# Patient Record
Sex: MALE | Race: BLACK OR AFRICAN AMERICAN | NOT HISPANIC OR LATINO | URBAN - METROPOLITAN AREA
[De-identification: names, ages, dates, MRNs, and addresses within clinical notes are randomized per-mention and may not be internally consistent; named-entity substitution may affect disease eponyms.]

---

## 2024-03-20 ENCOUNTER — HOSPITAL ENCOUNTER (INPATIENT)
Facility: HOSPITAL | Age: 69
LOS: 2 days | Discharge: HOME | DRG: 291 | End: 2024-03-22
Attending: EMERGENCY MEDICINE | Admitting: HOSPITALIST
Payer: COMMERCIAL

## 2024-03-20 DIAGNOSIS — E11.22 TYPE 2 DIABETES MELLITUS WITH STAGE 4 CHRONIC KIDNEY DISEASE, WITH LONG-TERM CURRENT USE OF INSULIN: ICD-10-CM

## 2024-03-20 DIAGNOSIS — R07.9 CHEST PAIN: ICD-10-CM

## 2024-03-20 DIAGNOSIS — J81.0 ACUTE PULMONARY EDEMA: ICD-10-CM

## 2024-03-20 DIAGNOSIS — Z79.4 TYPE 2 DIABETES MELLITUS WITH STAGE 4 CHRONIC KIDNEY DISEASE, WITH LONG-TERM CURRENT USE OF INSULIN: ICD-10-CM

## 2024-03-20 DIAGNOSIS — I10 BENIGN ESSENTIAL HYPERTENSION: Chronic | ICD-10-CM

## 2024-03-20 DIAGNOSIS — N18.4 TYPE 2 DIABETES MELLITUS WITH STAGE 4 CHRONIC KIDNEY DISEASE, WITH LONG-TERM CURRENT USE OF INSULIN: ICD-10-CM

## 2024-03-20 DIAGNOSIS — I50.9 ACUTE ON CHRONIC CONGESTIVE HEART FAILURE, UNSPECIFIED HEART FAILURE TYPE: ICD-10-CM

## 2024-03-20 DIAGNOSIS — J81.1 PULMONARY EDEMA: ICD-10-CM

## 2024-03-20 DIAGNOSIS — R06.02 SOB (SHORTNESS OF BREATH): ICD-10-CM

## 2024-03-20 DIAGNOSIS — R79.89 TROPONIN I ABOVE REFERENCE RANGE: ICD-10-CM

## 2024-03-20 DIAGNOSIS — R06.02 SHORTNESS OF BREATH: ICD-10-CM

## 2024-03-20 DIAGNOSIS — N17.9 AKI (ACUTE KIDNEY INJURY): Primary | ICD-10-CM

## 2024-03-20 PROBLEM — N18.9 ANEMIA DUE TO CHRONIC KIDNEY DISEASE: Status: ACTIVE | Noted: 2024-03-20

## 2024-03-20 PROBLEM — I25.10 CORONARY ARTERY DISEASE INVOLVING NATIVE CORONARY ARTERY WITHOUT ANGINA PECTORIS: Chronic | Status: ACTIVE | Noted: 2023-07-27

## 2024-03-20 PROBLEM — I63.9 CVA (CEREBRAL VASCULAR ACCIDENT): Status: RESOLVED | Noted: 2024-03-20 | Resolved: 2024-03-20

## 2024-03-20 PROBLEM — R60.0 BILATERAL EDEMA OF LOWER EXTREMITY: Chronic | Status: ACTIVE | Noted: 2019-01-16

## 2024-03-20 PROBLEM — B19.20 HEPATITIS C INFECTION: Status: ACTIVE | Noted: 2017-01-31

## 2024-03-20 PROBLEM — G47.33 OSA (OBSTRUCTIVE SLEEP APNEA): Chronic | Status: ACTIVE | Noted: 2019-08-27

## 2024-03-20 PROBLEM — E11.51 TYPE 2 DIABETES MELLITUS WITH PERIPHERAL CIRCULATORY DISORDER: Status: RESOLVED | Noted: 2024-01-29 | Resolved: 2024-03-20

## 2024-03-20 PROBLEM — E87.5 HYPERKALEMIA: Status: ACTIVE | Noted: 2024-03-20

## 2024-03-20 PROBLEM — D63.1 ANEMIA DUE TO CHRONIC KIDNEY DISEASE: Status: ACTIVE | Noted: 2024-03-20

## 2024-03-20 LAB
ALBUMIN SERPL BCP-MCNC: 3.5 G/DL (ref 3.5–5.2)
ALP SERPL-CCNC: 114 U/L (ref 55–135)
ALT SERPL W/O P-5'-P-CCNC: 15 U/L (ref 10–44)
ANION GAP SERPL CALC-SCNC: 8 MMOL/L (ref 8–16)
AST SERPL-CCNC: 18 U/L (ref 10–40)
BACTERIA #/AREA URNS AUTO: NORMAL /HPF
BASOPHILS # BLD AUTO: 0.02 K/UL (ref 0–0.2)
BASOPHILS NFR BLD: 0.3 % (ref 0–1.9)
BILIRUB SERPL-MCNC: 0.3 MG/DL (ref 0.1–1)
BILIRUB UR QL STRIP: NEGATIVE
BNP SERPL-MCNC: 429 PG/ML (ref 0–99)
BUN SERPL-MCNC: 58 MG/DL (ref 8–23)
CALCIUM SERPL-MCNC: 8.6 MG/DL (ref 8.7–10.5)
CHLORIDE SERPL-SCNC: 114 MMOL/L (ref 95–110)
CLARITY UR REFRACT.AUTO: CLEAR
CO2 SERPL-SCNC: 17 MMOL/L (ref 23–29)
COLOR UR AUTO: ABNORMAL
CREAT SERPL-MCNC: 3.5 MG/DL (ref 0.5–1.4)
DIFFERENTIAL METHOD BLD: ABNORMAL
EOSINOPHIL # BLD AUTO: 0.1 K/UL (ref 0–0.5)
EOSINOPHIL NFR BLD: 1.7 % (ref 0–8)
ERYTHROCYTE [DISTWIDTH] IN BLOOD BY AUTOMATED COUNT: 15.6 % (ref 11.5–14.5)
EST. GFR  (NO RACE VARIABLE): 18.3 ML/MIN/1.73 M^2
GLUCOSE SERPL-MCNC: 81 MG/DL (ref 70–110)
GLUCOSE UR QL STRIP: NEGATIVE
HCT VFR BLD AUTO: 28.9 % (ref 40–54)
HCV AB SERPL QL IA: REACTIVE
HGB BLD-MCNC: 8.8 G/DL (ref 14–18)
HGB UR QL STRIP: ABNORMAL
HIV 1+2 AB+HIV1 P24 AG SERPL QL IA: NORMAL
HYALINE CASTS UR QL AUTO: 0 /LPF
IMM GRANULOCYTES # BLD AUTO: 0.04 K/UL (ref 0–0.04)
IMM GRANULOCYTES NFR BLD AUTO: 0.6 % (ref 0–0.5)
INFLUENZA A, MOLECULAR: NEGATIVE
INFLUENZA B, MOLECULAR: NEGATIVE
KETONES UR QL STRIP: NEGATIVE
LEUKOCYTE ESTERASE UR QL STRIP: NEGATIVE
LYMPHOCYTES # BLD AUTO: 1 K/UL (ref 1–4.8)
LYMPHOCYTES NFR BLD: 15 % (ref 18–48)
MCH RBC QN AUTO: 23.4 PG (ref 27–31)
MCHC RBC AUTO-ENTMCNC: 30.4 G/DL (ref 32–36)
MCV RBC AUTO: 77 FL (ref 82–98)
MICROSCOPIC COMMENT: NORMAL
MONOCYTES # BLD AUTO: 0.7 K/UL (ref 0.3–1)
MONOCYTES NFR BLD: 10.4 % (ref 4–15)
NEUTROPHILS # BLD AUTO: 4.7 K/UL (ref 1.8–7.7)
NEUTROPHILS NFR BLD: 72 % (ref 38–73)
NITRITE UR QL STRIP: NEGATIVE
NRBC BLD-RTO: 0 /100 WBC
PH UR STRIP: 5 [PH] (ref 5–8)
PLATELET # BLD AUTO: 158 K/UL (ref 150–450)
PMV BLD AUTO: 11.9 FL (ref 9.2–12.9)
POTASSIUM SERPL-SCNC: 5.3 MMOL/L (ref 3.5–5.1)
PROT SERPL-MCNC: 7.7 G/DL (ref 6–8.4)
PROT UR QL STRIP: ABNORMAL
RBC # BLD AUTO: 3.76 M/UL (ref 4.6–6.2)
RBC #/AREA URNS AUTO: 1 /HPF (ref 0–4)
SARS-COV-2 RDRP RESP QL NAA+PROBE: NEGATIVE
SODIUM SERPL-SCNC: 139 MMOL/L (ref 136–145)
SP GR UR STRIP: 1.01 (ref 1–1.03)
SPECIMEN SOURCE: NORMAL
TROPONIN I SERPL DL<=0.01 NG/ML-MCNC: 0.04 NG/ML (ref 0–0.03)
TROPONIN I SERPL DL<=0.01 NG/ML-MCNC: 0.04 NG/ML (ref 0–0.03)
URN SPEC COLLECT METH UR: ABNORMAL
WBC # BLD AUTO: 6.46 K/UL (ref 3.9–12.7)
WBC #/AREA URNS AUTO: 2 /HPF (ref 0–5)

## 2024-03-20 PROCEDURE — 81001 URINALYSIS AUTO W/SCOPE: CPT | Performed by: EMERGENCY MEDICINE

## 2024-03-20 PROCEDURE — 63600175 PHARM REV CODE 636 W HCPCS: Performed by: EMERGENCY MEDICINE

## 2024-03-20 PROCEDURE — 63600175 PHARM REV CODE 636 W HCPCS: Performed by: HOSPITALIST

## 2024-03-20 PROCEDURE — 93005 ELECTROCARDIOGRAM TRACING: CPT

## 2024-03-20 PROCEDURE — U0002 COVID-19 LAB TEST NON-CDC: HCPCS | Performed by: EMERGENCY MEDICINE

## 2024-03-20 PROCEDURE — 93010 ELECTROCARDIOGRAM REPORT: CPT | Mod: 76,,, | Performed by: INTERNAL MEDICINE

## 2024-03-20 PROCEDURE — 87522 HEPATITIS C REVRS TRNSCRPJ: CPT | Performed by: PHYSICIAN ASSISTANT

## 2024-03-20 PROCEDURE — 84484 ASSAY OF TROPONIN QUANT: CPT | Mod: 91 | Performed by: EMERGENCY MEDICINE

## 2024-03-20 PROCEDURE — 84484 ASSAY OF TROPONIN QUANT: CPT | Performed by: NURSE PRACTITIONER

## 2024-03-20 PROCEDURE — 87389 HIV-1 AG W/HIV-1&-2 AB AG IA: CPT | Performed by: PHYSICIAN ASSISTANT

## 2024-03-20 PROCEDURE — 93010 ELECTROCARDIOGRAM REPORT: CPT | Mod: ,,, | Performed by: INTERNAL MEDICINE

## 2024-03-20 PROCEDURE — 86803 HEPATITIS C AB TEST: CPT | Performed by: PHYSICIAN ASSISTANT

## 2024-03-20 PROCEDURE — 85025 COMPLETE CBC W/AUTO DIFF WBC: CPT | Performed by: NURSE PRACTITIONER

## 2024-03-20 PROCEDURE — 83880 ASSAY OF NATRIURETIC PEPTIDE: CPT | Performed by: NURSE PRACTITIONER

## 2024-03-20 PROCEDURE — 87502 INFLUENZA DNA AMP PROBE: CPT | Performed by: EMERGENCY MEDICINE

## 2024-03-20 PROCEDURE — 99285 EMERGENCY DEPT VISIT HI MDM: CPT | Mod: 25

## 2024-03-20 PROCEDURE — 11000001 HC ACUTE MED/SURG PRIVATE ROOM

## 2024-03-20 PROCEDURE — 80053 COMPREHEN METABOLIC PANEL: CPT | Performed by: NURSE PRACTITIONER

## 2024-03-20 PROCEDURE — 21400001 HC TELEMETRY ROOM

## 2024-03-20 RX ORDER — PROCHLORPERAZINE EDISYLATE 5 MG/ML
5 INJECTION INTRAMUSCULAR; INTRAVENOUS EVERY 6 HOURS PRN
Status: DISCONTINUED | OUTPATIENT
Start: 2024-03-20 | End: 2024-03-22 | Stop reason: HOSPADM

## 2024-03-20 RX ORDER — AMOXICILLIN 250 MG
1 CAPSULE ORAL DAILY PRN
Status: DISCONTINUED | OUTPATIENT
Start: 2024-03-20 | End: 2024-03-22 | Stop reason: HOSPADM

## 2024-03-20 RX ORDER — FUROSEMIDE 10 MG/ML
80 INJECTION INTRAMUSCULAR; INTRAVENOUS ONCE
Status: COMPLETED | OUTPATIENT
Start: 2024-03-20 | End: 2024-03-20

## 2024-03-20 RX ORDER — NALOXONE HCL 0.4 MG/ML
0.02 VIAL (ML) INJECTION
Status: DISCONTINUED | OUTPATIENT
Start: 2024-03-20 | End: 2024-03-22 | Stop reason: HOSPADM

## 2024-03-20 RX ORDER — HEPARIN SODIUM 5000 [USP'U]/ML
5000 INJECTION, SOLUTION INTRAVENOUS; SUBCUTANEOUS EVERY 8 HOURS
Status: DISCONTINUED | OUTPATIENT
Start: 2024-03-21 | End: 2024-03-22 | Stop reason: HOSPADM

## 2024-03-20 RX ORDER — IBUPROFEN 200 MG
16 TABLET ORAL
Status: DISCONTINUED | OUTPATIENT
Start: 2024-03-20 | End: 2024-03-22 | Stop reason: HOSPADM

## 2024-03-20 RX ORDER — INSULIN GLARGINE 100 [IU]/ML
25 INJECTION, SOLUTION SUBCUTANEOUS NIGHTLY
COMMUNITY
Start: 2024-03-19

## 2024-03-20 RX ORDER — LOSARTAN POTASSIUM 25 MG/1
TABLET ORAL
Status: ON HOLD | COMMUNITY
Start: 2024-01-31 | End: 2024-03-22 | Stop reason: HOSPADM

## 2024-03-20 RX ORDER — DIPHENHYDRAMINE HCL 25 MG
1000 CAPSULE ORAL DAILY
Status: ON HOLD | COMMUNITY
Start: 2024-02-19 | End: 2024-03-21

## 2024-03-20 RX ORDER — ASPIRIN 81 MG/1
1 TABLET ORAL DAILY
COMMUNITY

## 2024-03-20 RX ORDER — FUROSEMIDE 10 MG/ML
40 INJECTION INTRAMUSCULAR; INTRAVENOUS
Status: COMPLETED | OUTPATIENT
Start: 2024-03-20 | End: 2024-03-20

## 2024-03-20 RX ORDER — GLIPIZIDE 10 MG/1
10 TABLET ORAL 2 TIMES DAILY WITH MEALS
Status: ON HOLD | COMMUNITY
Start: 2023-07-27 | End: 2024-03-22

## 2024-03-20 RX ORDER — DOCUSATE SODIUM 100 MG/1
100 CAPSULE, LIQUID FILLED ORAL DAILY
Status: ON HOLD | COMMUNITY
Start: 2023-04-28 | End: 2024-03-21

## 2024-03-20 RX ORDER — FERROUS SULFATE 325(65) MG
325 TABLET ORAL DAILY
Status: ON HOLD | COMMUNITY
Start: 2023-04-28 | End: 2024-03-21

## 2024-03-20 RX ORDER — GLUCAGON 1 MG
1 KIT INJECTION
Status: DISCONTINUED | OUTPATIENT
Start: 2024-03-20 | End: 2024-03-22 | Stop reason: HOSPADM

## 2024-03-20 RX ORDER — FUROSEMIDE 20 MG/1
20 TABLET ORAL DAILY
Status: ON HOLD | COMMUNITY
Start: 2023-08-04 | End: 2024-03-21

## 2024-03-20 RX ORDER — CARVEDILOL 6.25 MG/1
6.25 TABLET ORAL 2 TIMES DAILY
Status: ON HOLD | COMMUNITY
Start: 2024-02-05 | End: 2024-03-21

## 2024-03-20 RX ORDER — CARVEDILOL 12.5 MG/1
12.5 TABLET ORAL 2 TIMES DAILY
COMMUNITY
Start: 2024-02-19

## 2024-03-20 RX ORDER — ACETAMINOPHEN 325 MG/1
650 TABLET ORAL EVERY 4 HOURS PRN
Status: DISCONTINUED | OUTPATIENT
Start: 2024-03-20 | End: 2024-03-22 | Stop reason: HOSPADM

## 2024-03-20 RX ORDER — ALUMINUM HYDROXIDE, MAGNESIUM HYDROXIDE, AND SIMETHICONE 1200; 120; 1200 MG/30ML; MG/30ML; MG/30ML
30 SUSPENSION ORAL 4 TIMES DAILY PRN
Status: DISCONTINUED | OUTPATIENT
Start: 2024-03-20 | End: 2024-03-22 | Stop reason: HOSPADM

## 2024-03-20 RX ORDER — FAMOTIDINE 20 MG/1
1 TABLET, FILM COATED ORAL NIGHTLY PRN
COMMUNITY
Start: 2024-01-29

## 2024-03-20 RX ORDER — FLUTICASONE PROPIONATE 50 MCG
1 SPRAY, SUSPENSION (ML) NASAL DAILY PRN
COMMUNITY
Start: 2023-12-06

## 2024-03-20 RX ORDER — IBUPROFEN 200 MG
24 TABLET ORAL
Status: DISCONTINUED | OUTPATIENT
Start: 2024-03-20 | End: 2024-03-22 | Stop reason: HOSPADM

## 2024-03-20 RX ORDER — SODIUM CHLORIDE 0.9 % (FLUSH) 0.9 %
10 SYRINGE (ML) INJECTION EVERY 6 HOURS PRN
Status: DISCONTINUED | OUTPATIENT
Start: 2024-03-20 | End: 2024-03-22 | Stop reason: HOSPADM

## 2024-03-20 RX ORDER — POLYETHYLENE GLYCOL 3350 17 G/17G
17 POWDER, FOR SOLUTION ORAL 2 TIMES DAILY PRN
Status: DISCONTINUED | OUTPATIENT
Start: 2024-03-20 | End: 2024-03-22 | Stop reason: HOSPADM

## 2024-03-20 RX ORDER — TALC
6 POWDER (GRAM) TOPICAL NIGHTLY PRN
Status: DISCONTINUED | OUTPATIENT
Start: 2024-03-20 | End: 2024-03-22 | Stop reason: HOSPADM

## 2024-03-20 RX ORDER — INSULIN ASPART 100 [IU]/ML
0-5 INJECTION, SOLUTION INTRAVENOUS; SUBCUTANEOUS
Status: DISCONTINUED | OUTPATIENT
Start: 2024-03-20 | End: 2024-03-22 | Stop reason: HOSPADM

## 2024-03-20 RX ORDER — ONDANSETRON HYDROCHLORIDE 2 MG/ML
4 INJECTION, SOLUTION INTRAVENOUS EVERY 8 HOURS PRN
Status: DISCONTINUED | OUTPATIENT
Start: 2024-03-20 | End: 2024-03-22 | Stop reason: HOSPADM

## 2024-03-20 RX ORDER — NIFEDIPINE 90 MG/1
90 TABLET, EXTENDED RELEASE ORAL DAILY
COMMUNITY
Start: 2024-01-29 | End: 2024-07-27

## 2024-03-20 RX ORDER — ATORVASTATIN CALCIUM 80 MG/1
80 TABLET, FILM COATED ORAL DAILY
COMMUNITY
Start: 2023-07-27 | End: 2024-07-27

## 2024-03-20 RX ADMIN — FUROSEMIDE 80 MG: 10 INJECTION, SOLUTION INTRAVENOUS at 10:03

## 2024-03-20 RX ADMIN — FUROSEMIDE 40 MG: 10 INJECTION, SOLUTION INTRAVENOUS at 07:03

## 2024-03-20 NOTE — FIRST PROVIDER EVALUATION
"A light undermining [facility] nurse practitioner to ER today [facility] nurse practitioner [facility] yesterday Emergency Department TeleTriage Encounter Note      CHIEF COMPLAINT    Chief Complaint   Patient presents with    Shortness of Breath     Sob with mild exertion; recent med change (losartan and carvedilol) been feeling strange ever since then. "Trying to walk is difficult". Denies chest pain       VITAL SIGNS   Initial Vitals [03/20/24 1625]   BP Pulse Resp Temp SpO2   (!) 157/72 77 (!) 22 97.8 °F (36.6 °C) 95 %      MAP       --            ALLERGIES    Review of patient's allergies indicates:  Not on File    PROVIDER TRIAGE NOTE  68 year old male with history of HTN and prior CVA presents to the [facility] complaints of worsening SOB x 2-3 weeks ever since recent change in BP medications. Denies current diuretic use. Denies CP. Reports exertion worsens symptoms. Reports chronic bilateral leg edema.     AAOx3, respirations even and non- labored, stable vitals, normal coloration of skin, sitting upright in triage chair, appears in no acute distress.          ORDERS  Labs Reviewed   HIV 1 / 2 ANTIBODY   HEPATITIS C ANTIBODY       ED Orders (720h ago, onward)      Start Ordered     Status Ordering Provider    03/20/24 1629 03/20/24 1628  EKG 12-lead  Once         Completed by LIANNA PHILIPPE on 3/20/2024 at  5:02 PM HUMBERTO BARAJAS    03/20/24 1627 03/20/24 1626  HIV 1/2 Ag/Ab (4th Gen)  STAT         Ordered ETHAN SCRUGGS    03/20/24 1627 03/20/24 1626  Hepatitis C Antibody  STAT         Ordered ETHAN SCRUGGS              Virtual Visit Note: The provider triage portion of this emergency department evaluation and documentation was performed via [employer], a HIPAA-compliant telemedicine application, in concert with a tele-presenter in the room. A face to face patient evaluation with one of my colleagues will occur once the patient is placed in an emergency department room.      DISCLAIMER: This note was prepared " with M*Modal voice recognition transcription software. Garbled syntax, mangled pronouns, and other bizarre constructions may be attributed to that software system.

## 2024-03-20 NOTE — Clinical Note
Diagnosis: MAHAMED (acute kidney injury) [[id number]]   Future Attending Provider: DAR MARRERO [[id number]]   Reason for IP Medical Treatment  (Clinical interventions that can only be accomplished in the IP setting? ) :: diuresis, echo   I certify that Inpatient services for greater than or equal to 2 midnights are medically necessary:: Yes   Plans for Post-Acute care--if anticipated (pick the single best option):: A. No post acute care anticipated at this time

## 2024-03-20 NOTE — ED TRIAGE NOTES
"Chon Simon, a 68 y.o. male presents to the ED w/ complaint of SOB x 34 weeks. Pt states he's had some SOB worsens with exertion. He also states that he recently had his B?P meds changed and thinks that may be what's causing the issue. Pt denies chest pain, N/V.     Triage note:  Chief Complaint   Patient presents with    Shortness of Breath     Sob with mild exertion; recent med change (losartan and carvedilol) been feeling strange ever since then. "Trying to walk is difficult". Denies chest pain     Review of patient's allergies indicates:  No Known Allergies  Past Medical History:   Diagnosis Date    Diabetes mellitus     Hypertension     Stroke        "

## 2024-03-20 NOTE — ED PROVIDER NOTES
"Encounter Date: 3/20/2024       History     Chief Complaint   Patient presents with    Shortness of Breath     Sob with mild exertion; recent med change (losartan and carvedilol) been feeling strange ever since then. "Trying to walk is difficult". Denies chest pain     67 yo M with extensive pmhx including CKD IV, HTN, HLD, CVA, ENMANUEL, DM, anemia, bilat LE edema presents with a chief complaint of shortness of breath.  Patient lives in [facility].  He came back to [place] 1 week ago for a .  He notes he has had dyspnea on exertion since returning here.  Progressively worsening.  He does report occasional cough productive of phlegm but it seems to have improved today.  He reports associated lower extremity edema.  No chest pain or fever.  He notes that at baseline he is able to walk 45 minutes on a trach but he was getting winded with minimal exertion.  He notes that approximately 1 month ago his blood pressure medications were changed.  He had potassium issues and his losartan was decreased and carvedilol was added and has been subsequently increased.  He is uncertain of the exact dose.  I was able to obtain some of the patient's medical records through Care everywhere.  He reports adequate urine output.  He denies any history of diuretic use.        Review of patient's allergies indicates:  No Known Allergies  Past Medical History:   Diagnosis Date    Diabetes mellitus     Hypertension     Stroke      No past surgical history on file.  History reviewed. No pertinent family history.     Review of Systems    Physical Exam     Initial Vitals [24 1625]   BP Pulse Resp Temp SpO2   (!) 157/72 77 (!) 22 97.8 °F (36.6 °C) 95 %      MAP       --         Physical Exam    Nursing note and vitals reviewed.  Constitutional: He appears well-developed and well-nourished. He is not diaphoretic. No distress.   HENT:   Head: Normocephalic and atraumatic.   Eyes: Right eye exhibits no discharge. Left eye " exhibits no discharge. No scleral icterus.   Neck: Neck supple. No JVD present.   Normal range of motion.  Cardiovascular:  Normal rate, regular rhythm and normal heart sounds.     Exam reveals no gallop and no friction rub.       No murmur heard.  Pulmonary/Chest: No respiratory distress. He has no wheezes. He has no rhonchi.   Tachypneic on room air with increased work of breathing but able to speaking complete sentences, diminished at bases   Abdominal: Abdomen is soft. He exhibits no distension and no mass. There is no abdominal tenderness. There is no rebound and no guarding.   Musculoskeletal:         General: Edema (Pitting to bilateral lower extremities, symmetric) present. No tenderness. Normal range of motion.      Cervical back: Normal range of motion and neck supple.     Neurological: He is alert and oriented to person, place, and time. He has normal strength. No sensory deficit.   Skin: Skin is warm and dry. Capillary refill takes less than 2 seconds.   Psychiatric: Thought content normal.         ED Course   Procedures  Labs Reviewed   HEPATITIS C ANTIBODY - Abnormal; Notable for the following components:       Result Value    Hepatitis C Ab Reactive (*)     All other components within normal limits    Narrative:     Release to patient->Immediate   CBC W/ AUTO DIFFERENTIAL - Abnormal; Notable for the following components:    RBC 3.76 (*)     Hemoglobin 8.8 (*)     Hematocrit 28.9 (*)     MCV 77 (*)     MCH 23.4 (*)     MCHC 30.4 (*)     RDW 15.6 (*)     Immature Granulocytes 0.6 (*)     Lymph % 15.0 (*)     All other components within normal limits   B-TYPE NATRIURETIC PEPTIDE - Abnormal; Notable for the following components:     (*)     All other components within normal limits   COMPREHENSIVE METABOLIC PANEL - Abnormal; Notable for the following components:    Potassium 5.3 (*)     Chloride 114 (*)     CO2 17 (*)     BUN 58 (*)     Creatinine 3.5 (*)     Calcium 8.6 (*)     eGFR 18.3 (*)      All other components within normal limits   TROPONIN I - Abnormal; Notable for the following components:    Troponin I 0.038 (*)     All other components within normal limits   INFLUENZA A & B BY MOLECULAR   HIV 1 / 2 ANTIBODY    Narrative:     Release to patient->Immediate   URINALYSIS, REFLEX TO URINE CULTURE   HEPATITIS C RNA, QUANTITATIVE, PCR   SARS-COV-2 RNA AMPLIFICATION, QUAL   TROPONIN I          Imaging Results              X-Ray Chest AP Portable (Final result)  Result time 03/20/24 18:37:47   Procedure changed from X-Ray Chest PA And Lateral     Final result by Elvis Queen DO (03/20/24 18:37:47)                   Impression:      Pulmonary edema or multifocal pneumonia.      Electronically signed by: Elvis Queen  Date:    03/20/2024  Time:    18:37               Narrative:    EXAMINATION:  XR CHEST AP PORTABLE    CLINICAL HISTORY:  SOB; Shortness of breath    TECHNIQUE:  Single frontal view of the chest was performed.    COMPARISON:  None    FINDINGS:  The lungs are well expanded.  There are bilateral symmetric interstitial and alveolar opacities with pulmonary vascular congestion.  The pleural spaces are clear.  The cardiac silhouette is enlarged.  Osseous structures demonstrate degenerative changes.  There are right upper quadrant surgical clips.                                       Medications   furosemide injection 40 mg (has no administration in time range)     Medical Decision Making  67 yo M with extensive pmhx including CKD IV, HTN, HLD, CVA, ENMANUEL, DM, anemia, bilat LE edema presents with a chief complaint of 1 week of progressively worsening dyspnea on exertion.    My differential includes, but is not limited to:  CHF exacerbation, ACS, pneumonia, MAHAMED, electrolyte abnormalities, COVID, influenza    Labs were ordered by provider in triage.  Will obtain additional labs and chest x-ray.    Reassessment:  CMP with creatinine of 3.5 and BUN of 58.  According to care everywhere, creatinine  was 2.7 January 28th.  CMP also reveals hyperkalemia of 5.3.  Acidosis with a bicarb of 17.  Troponin is elevated at 0.038.  Patient denies any chest pain, will trend.  BNP is elevated at 429, no previous on record.  Hep C antibody is reactive.  Chest x-ray with pulmonary edema suspicious for CHF.  Patient with no known history of CHF.  I suspect that pulmonary edema is what is causing patient's symptoms.  He has had a cough but not severe today.  No fevers or chest pain.  I suspect that bilateral pulmonary edema is CHF.  Will diurese with 40 of IV Lasix.  Given presumed new onset CHF, will admit for echo, further cardiac evaluation.  On repeat assessment, he appears stable on room air.    Risk  Prescription drug management.                                      Clinical Impression:  Final diagnoses:  [R06.02] Shortness of breath  [R06.02] SOB (shortness of breath)  [N17.9] MAHAMED (acute kidney injury) (Primary)  [I50.9] Acute on chronic congestive heart failure, unspecified heart failure type  [R79.89] Troponin I above reference range          ED Disposition Condition    Admit Stable                Channing Jimenez MD  03/20/24 8737

## 2024-03-21 PROBLEM — I50.9 ACUTE ON CHRONIC CONGESTIVE HEART FAILURE: Status: ACTIVE | Noted: 2024-03-21

## 2024-03-21 LAB
ALBUMIN SERPL BCP-MCNC: 3.2 G/DL (ref 3.5–5.2)
ANION GAP SERPL CALC-SCNC: 8 MMOL/L (ref 8–16)
ASCENDING AORTA: 2.46 CM
AV INDEX (PROSTH): 0.59
AV MEAN GRADIENT: 5 MMHG
AV PEAK GRADIENT: 9 MMHG
AV VALVE AREA BY VELOCITY RATIO: 1.88 CM²
AV VALVE AREA: 1.76 CM²
AV VELOCITY RATIO: 0.63
BASOPHILS # BLD AUTO: 0.02 K/UL (ref 0–0.2)
BASOPHILS NFR BLD: 0.4 % (ref 0–1.9)
BSA FOR ECHO PROCEDURE: 1.98 M2
BUN SERPL-MCNC: 58 MG/DL (ref 8–23)
CALCIUM SERPL-MCNC: 8.4 MG/DL (ref 8.7–10.5)
CHLORIDE SERPL-SCNC: 113 MMOL/L (ref 95–110)
CK SERPL-CCNC: 234 U/L (ref 20–200)
CO2 SERPL-SCNC: 19 MMOL/L (ref 23–29)
CREAT SERPL-MCNC: 3.4 MG/DL (ref 0.5–1.4)
CV ECHO LV RWT: 0.37 CM
DIFFERENTIAL METHOD BLD: ABNORMAL
DOP CALC AO PEAK VEL: 1.49 M/S
DOP CALC AO VTI: 30.3 CM
DOP CALC LVOT AREA: 3 CM2
DOP CALC LVOT DIAMETER: 1.95 CM
DOP CALC LVOT PEAK VEL: 0.94 M/S
DOP CALC LVOT STROKE VOLUME: 53.22 CM3
DOP CALCLVOT PEAK VEL VTI: 17.83 CM
E WAVE DECELERATION TIME: 183.02 MSEC
E/A RATIO: 1.34
E/E' RATIO: 10 M/S
ECHO LV POSTERIOR WALL: 1.04 CM (ref 0.6–1.1)
EOSINOPHIL # BLD AUTO: 0.1 K/UL (ref 0–0.5)
EOSINOPHIL NFR BLD: 2.5 % (ref 0–8)
ERYTHROCYTE [DISTWIDTH] IN BLOOD BY AUTOMATED COUNT: 15.5 % (ref 11.5–14.5)
EST. GFR  (NO RACE VARIABLE): 19 ML/MIN/1.73 M^2
ESTIMATED AVG GLUCOSE: 134 MG/DL (ref 68–131)
FRACTIONAL SHORTENING: 32 % (ref 28–44)
GLUCOSE SERPL-MCNC: 87 MG/DL (ref 70–110)
HBA1C MFR BLD: 6.3 % (ref 4–5.6)
HCT VFR BLD AUTO: 26.8 % (ref 40–54)
HCV RNA SERPL QL NAA+PROBE: NOT DETECTED
HCV RNA SPEC NAA+PROBE-ACNC: NOT DETECTED IU/ML
HGB BLD-MCNC: 8.4 G/DL (ref 14–18)
IMM GRANULOCYTES # BLD AUTO: 0.02 K/UL (ref 0–0.04)
IMM GRANULOCYTES NFR BLD AUTO: 0.4 % (ref 0–0.5)
INTERVENTRICULAR SEPTUM: 0.85 CM (ref 0.6–1.1)
LA MAJOR: 5.8 CM
LA MINOR: 5.49 CM
LA WIDTH: 4.31 CM
LEFT ATRIUM SIZE: 4.45 CM
LEFT ATRIUM VOLUME INDEX MOD: 34.8 ML/M2
LEFT ATRIUM VOLUME INDEX: 47.6 ML/M2
LEFT ATRIUM VOLUME MOD: 67.07 CM3
LEFT ATRIUM VOLUME: 91.96 CM3
LEFT INTERNAL DIMENSION IN SYSTOLE: 3.81 CM (ref 2.1–4)
LEFT VENTRICLE DIASTOLIC VOLUME INDEX: 79.11 ML/M2
LEFT VENTRICLE DIASTOLIC VOLUME: 152.69 ML
LEFT VENTRICLE MASS INDEX: 105 G/M2
LEFT VENTRICLE SYSTOLIC VOLUME INDEX: 32.3 ML/M2
LEFT VENTRICLE SYSTOLIC VOLUME: 62.36 ML
LEFT VENTRICULAR INTERNAL DIMENSION IN DIASTOLE: 5.58 CM (ref 3.5–6)
LEFT VENTRICULAR MASS: 202.85 G
LV LATERAL E/E' RATIO: 8.18 M/S
LV SEPTAL E/E' RATIO: 12.86 M/S
LYMPHOCYTES # BLD AUTO: 0.9 K/UL (ref 1–4.8)
LYMPHOCYTES NFR BLD: 18 % (ref 18–48)
MAGNESIUM SERPL-MCNC: 2 MG/DL (ref 1.6–2.6)
MCH RBC QN AUTO: 24 PG (ref 27–31)
MCHC RBC AUTO-ENTMCNC: 31.3 G/DL (ref 32–36)
MCV RBC AUTO: 77 FL (ref 82–98)
MONOCYTES # BLD AUTO: 0.6 K/UL (ref 0.3–1)
MONOCYTES NFR BLD: 12.8 % (ref 4–15)
MV PEAK A VEL: 0.67 M/S
MV PEAK E VEL: 0.9 M/S
NEUTROPHILS # BLD AUTO: 3.2 K/UL (ref 1.8–7.7)
NEUTROPHILS NFR BLD: 65.9 % (ref 38–73)
NRBC BLD-RTO: 0 /100 WBC
OHS QRS DURATION: 82 MS
OHS QRS DURATION: 92 MS
OHS QTC CALCULATION: 405 MS
OHS QTC CALCULATION: 416 MS
PHOSPHATE SERPL-MCNC: 3.7 MG/DL (ref 2.7–4.5)
PHOSPHATE SERPL-MCNC: 3.7 MG/DL (ref 2.7–4.5)
PISA TR MAX VEL: 2.47 M/S
PLATELET # BLD AUTO: 161 K/UL (ref 150–450)
PMV BLD AUTO: 12.3 FL (ref 9.2–12.9)
POCT GLUCOSE: 121 MG/DL (ref 70–110)
POCT GLUCOSE: 125 MG/DL (ref 70–110)
POCT GLUCOSE: 96 MG/DL (ref 70–110)
POTASSIUM SERPL-SCNC: 4.7 MMOL/L (ref 3.5–5.1)
PTH-INTACT SERPL-MCNC: 443 PG/ML (ref 9–77)
RA MAJOR: 5.64 CM
RA PRESSURE ESTIMATED: 15 MMHG
RA WIDTH: 3.38 CM
RBC # BLD AUTO: 3.5 M/UL (ref 4.6–6.2)
RIGHT VENTRICULAR END-DIASTOLIC DIMENSION: 4.82 CM
RV TB RVSP: 17 MMHG
SINUS: 3.3 CM
SODIUM SERPL-SCNC: 140 MMOL/L (ref 136–145)
STJ: 2.95 CM
TDI LATERAL: 0.11 M/S
TDI SEPTAL: 0.07 M/S
TDI: 0.09 M/S
TR MAX PG: 24 MMHG
TRICUSPID ANNULAR PLANE SYSTOLIC EXCURSION: 2.02 CM
TV REST PULMONARY ARTERY PRESSURE: 39 MMHG
WBC # BLD AUTO: 4.84 K/UL (ref 3.9–12.7)
Z-SCORE OF LEFT VENTRICULAR DIMENSION IN END DIASTOLE: 0.21
Z-SCORE OF LEFT VENTRICULAR DIMENSION IN END SYSTOLE: 0.99

## 2024-03-21 PROCEDURE — 85025 COMPLETE CBC W/AUTO DIFF WBC: CPT | Performed by: HOSPITALIST

## 2024-03-21 PROCEDURE — 83036 HEMOGLOBIN GLYCOSYLATED A1C: CPT | Performed by: HOSPITALIST

## 2024-03-21 PROCEDURE — 83735 ASSAY OF MAGNESIUM: CPT | Performed by: HOSPITALIST

## 2024-03-21 PROCEDURE — 83970 ASSAY OF PARATHORMONE: CPT | Performed by: HOSPITALIST

## 2024-03-21 PROCEDURE — 36415 COLL VENOUS BLD VENIPUNCTURE: CPT | Performed by: HOSPITALIST

## 2024-03-21 PROCEDURE — 63600175 PHARM REV CODE 636 W HCPCS: Performed by: HOSPITALIST

## 2024-03-21 PROCEDURE — 82550 ASSAY OF CK (CPK): CPT | Performed by: HOSPITALIST

## 2024-03-21 PROCEDURE — 80069 RENAL FUNCTION PANEL: CPT | Performed by: HOSPITALIST

## 2024-03-21 PROCEDURE — 21400001 HC TELEMETRY ROOM

## 2024-03-21 PROCEDURE — 25000003 PHARM REV CODE 250: Performed by: HOSPITALIST

## 2024-03-21 RX ORDER — FAMOTIDINE 20 MG/1
20 TABLET, FILM COATED ORAL DAILY PRN
Status: DISCONTINUED | OUTPATIENT
Start: 2024-03-21 | End: 2024-03-22 | Stop reason: HOSPADM

## 2024-03-21 RX ORDER — METOLAZONE 5 MG/1
5 TABLET ORAL
Status: DISCONTINUED | OUTPATIENT
Start: 2024-03-21 | End: 2024-03-22 | Stop reason: HOSPADM

## 2024-03-21 RX ORDER — POLYETHYLENE GLYCOL 3350 17 G/17G
17 POWDER, FOR SOLUTION ORAL DAILY
Status: DISCONTINUED | OUTPATIENT
Start: 2024-03-21 | End: 2024-03-22 | Stop reason: HOSPADM

## 2024-03-21 RX ORDER — FUROSEMIDE 10 MG/ML
80 INJECTION INTRAMUSCULAR; INTRAVENOUS 2 TIMES DAILY
Status: DISCONTINUED | OUTPATIENT
Start: 2024-03-21 | End: 2024-03-22 | Stop reason: HOSPADM

## 2024-03-21 RX ORDER — ATORVASTATIN CALCIUM 40 MG/1
80 TABLET, FILM COATED ORAL DAILY
Status: DISCONTINUED | OUTPATIENT
Start: 2024-03-21 | End: 2024-03-22 | Stop reason: HOSPADM

## 2024-03-21 RX ORDER — AMOXICILLIN 250 MG
1 CAPSULE ORAL 2 TIMES DAILY
Status: DISCONTINUED | OUTPATIENT
Start: 2024-03-21 | End: 2024-03-22 | Stop reason: HOSPADM

## 2024-03-21 RX ORDER — NIFEDIPINE 30 MG/1
90 TABLET, EXTENDED RELEASE ORAL DAILY
Status: DISCONTINUED | OUTPATIENT
Start: 2024-03-21 | End: 2024-03-22 | Stop reason: HOSPADM

## 2024-03-21 RX ORDER — FLUTICASONE PROPIONATE 50 MCG
1 SPRAY, SUSPENSION (ML) NASAL DAILY PRN
Status: DISCONTINUED | OUTPATIENT
Start: 2024-03-21 | End: 2024-03-22 | Stop reason: HOSPADM

## 2024-03-21 RX ORDER — ASPIRIN 81 MG/1
81 TABLET ORAL DAILY
Status: DISCONTINUED | OUTPATIENT
Start: 2024-03-21 | End: 2024-03-22 | Stop reason: HOSPADM

## 2024-03-21 RX ORDER — BISACODYL 10 MG/1
10 SUPPOSITORY RECTAL DAILY PRN
Status: DISCONTINUED | OUTPATIENT
Start: 2024-03-21 | End: 2024-03-22 | Stop reason: HOSPADM

## 2024-03-21 RX ADMIN — HEPARIN SODIUM 5000 UNITS: 5000 INJECTION INTRAVENOUS; SUBCUTANEOUS at 02:03

## 2024-03-21 RX ADMIN — POLYETHYLENE GLYCOL 3350 17 G: 17 POWDER, FOR SOLUTION ORAL at 02:03

## 2024-03-21 RX ADMIN — NIFEDIPINE 90 MG: 30 TABLET, FILM COATED, EXTENDED RELEASE ORAL at 09:03

## 2024-03-21 RX ADMIN — HEPARIN SODIUM 5000 UNITS: 5000 INJECTION INTRAVENOUS; SUBCUTANEOUS at 09:03

## 2024-03-21 RX ADMIN — FUROSEMIDE 80 MG: 10 INJECTION, SOLUTION INTRAVENOUS at 09:03

## 2024-03-21 RX ADMIN — INSULIN DETEMIR 8 UNITS: 100 INJECTION, SOLUTION SUBCUTANEOUS at 09:03

## 2024-03-21 RX ADMIN — ASPIRIN 81 MG: 81 TABLET, COATED ORAL at 09:03

## 2024-03-21 RX ADMIN — HEPARIN SODIUM 5000 UNITS: 5000 INJECTION INTRAVENOUS; SUBCUTANEOUS at 05:03

## 2024-03-21 RX ADMIN — DOCUSATE SODIUM AND SENNOSIDES 1 TABLET: 8.6; 5 TABLET, FILM COATED ORAL at 02:03

## 2024-03-21 RX ADMIN — ATORVASTATIN CALCIUM 80 MG: 40 TABLET, FILM COATED ORAL at 09:03

## 2024-03-21 RX ADMIN — METOLAZONE 5 MG: 5 TABLET ORAL at 09:03

## 2024-03-21 NOTE — ASSESSMENT & PLAN NOTE
Chronic, uncontrolled. Latest blood pressure and vitals reviewed-     Temp:  [97.8 °F (36.6 °C)-98.5 °F (36.9 °C)]   Pulse:  [76-86]   Resp:  [18-22]   BP: (157-167)/(72-77)   SpO2:  [93 %-95 %] .   Home meds for hypertension were reviewed and noted below.   Hypertension Medications               carvediloL (COREG) 12.5 MG tablet Take 12.5 mg by mouth 2 (two) times daily.              losartan (COZAAR) 25 MG tablet Take 1 tablet (25 mg total) by mouth daily for high blood pressure    NIFEdipine (PROCARDIA-XL) 90 MG (OSM) 24 hr tablet Take 90 mg by mouth.            While in the hospital, will manage blood pressure as follows; continue nifedipine  -holding losartan due to MAHAMED, and carvedilol until acute heart failure ruled out.

## 2024-03-21 NOTE — SUBJECTIVE & OBJECTIVE
Interval History:   3/21: renal function remains stable. Patient feels a bit better. Notes improvement in leg edema with diuresis.     Review of Systems   Constitutional:  Positive for activity change. Negative for appetite change and chills.   HENT:  Negative for trouble swallowing.    Respiratory:  Positive for shortness of breath. Negative for chest tightness.    Cardiovascular:  Positive for leg swelling. Negative for chest pain and palpitations.   Gastrointestinal: Negative.    Genitourinary: Negative.    Musculoskeletal: Negative.    Psychiatric/Behavioral: Negative.       Objective:     Vital Signs (Most Recent):  Temp: 97.6 °F (36.4 °C) (03/21/24 1440)  Pulse: 69 (03/21/24 1512)  Resp: 18 (03/21/24 1440)  BP: (!) 155/73 (03/21/24 1440)  SpO2: 97 % (03/21/24 1440) Vital Signs (24h Range):  Temp:  [97.6 °F (36.4 °C)-98.6 °F (37 °C)] 97.6 °F (36.4 °C)  Pulse:  [69-86] 69  Resp:  [18-20] 18  SpO2:  [93 %-98 %] 97 %  BP: (138-167)/(65-77) 155/73     Weight: 85.3 kg (188 lb)  Body mass index is 31.28 kg/m².    Intake/Output Summary (Last 24 hours) at 3/21/2024 1629  Last data filed at 3/21/2024 0606  Gross per 24 hour   Intake 350 ml   Output 1480 ml   Net -1130 ml         Physical Exam  Vitals and nursing note reviewed.   Constitutional:       Appearance: He is obese. He is ill-appearing.   HENT:      Head: Normocephalic and atraumatic.      Mouth/Throat:      Mouth: Mucous membranes are moist.   Eyes:      General: No scleral icterus.     Pupils: Pupils are equal, round, and reactive to light.   Neck:      Comments: Unable to note the JVP  Cardiovascular:      Rate and Rhythm: Normal rate and regular rhythm.      Pulses: Normal pulses.   Pulmonary:      Effort: Pulmonary effort is normal. No respiratory distress.      Breath sounds: Examination of the right-middle field reveals rales. Examination of the right-lower field reveals rales. Examination of the left-lower field reveals rales. Rales present.    Abdominal:      General: Abdomen is protuberant.      Tenderness: There is no abdominal tenderness.   Musculoskeletal:      Cervical back: Neck supple.      Right lower leg: 3+ Edema (Dependent into the thighs) present.      Left lower leg: 3+ Edema (Dependent into the thighs) present.   Skin:     General: Skin is warm and dry.   Neurological:      General: No focal deficit present.      Mental Status: He is alert and oriented to person, place, and time.   Psychiatric:         Mood and Affect: Mood normal.         Behavior: Behavior normal.             Significant Labs: All pertinent labs within the past 24 hours have been reviewed.    Significant Imaging: I have reviewed all pertinent imaging results/findings within the past 24 hours.

## 2024-03-21 NOTE — ASSESSMENT & PLAN NOTE
"Patient's FSGs are controlled on current medication regimen.  Last A1c reviewed-   Lab Results   Component Value Date    HGBA1C 7.5 (H) 11/09/2022     Most recent fingerstick glucose reviewed- No results for input(s): "POCTGLUCOSE" in the last 24 hours.  Current correctional scale  Low  Maintain anti-hyperglycemic dose as follows-   Antihyperglycemics (From admission, onward)      Start     Stop Route Frequency Ordered    03/20/24 2338  insulin aspart U-100 pen 0-5 Units         -- SubQ Before meals & nightly PRN 03/20/24 2238          Hold Oral hypoglycemics while patient is in the hospital.    "

## 2024-03-21 NOTE — SUBJECTIVE & OBJECTIVE
Past Medical History:   Diagnosis Date    Anemia due to chronic kidney disease 03/20/2024    Coronary artery disease involving native coronary artery without angina pectoris 07/27/2023     Cardiology stress test done and normal with previous signs of CAD in 06/2023   _ Recommend beta blocker       TTE on 5/9/2023 showed normal biventricular function with LVEF 55%. Wall motion assessment was difficult as Definity was not given. Lexiscan MPI on 5/18/2023 showed no evidence of ischemia. However, there was evidence of a medium to large size infarction of the inferolateral with some ext    CVA (cerebral vascular accident) 03/20/2024    Formatting of this note might be different from the original.   Patient left-handed, so this is significant deficit.    Diabetes mellitus     Elevated LFTs 11/10/2016    Hypertension     ENMANUEL (obstructive sleep apnea) 08/27/2019 2019 extremely severe ahi 72       Stroke     Type 2 diabetes mellitus with stage 4 chronic kidney disease, with long-term current use of insulin 11/18/2011     Medications: Lantus 25 U at bedtime and Glipizide 10 mg twice a day.    Hba1c 6.8   Complications:    - CKD stage 4 with microalbuminuria and stable, Saw Nephrology, Dr. Reinoso.    - History of DM retinopathy. Last eye exam: 12/2023   - No Neuropathy. Last foot exam: Podiatry 11/2023  with mild peripheral circulatory disorder   - History of CVA and CAD. Aspirin: Yes Statin: Yes       No past surgical history on file.    Review of patient's allergies indicates:   Allergen Reactions    Lisinopril Other (See Comments)     Other Reaction(s): Cough    cough   Other reaction(s): Cough   n/a    n/a     Current Facility-Administered Medications   Medication Frequency    acetaminophen tablet 650 mg Q4H PRN    aluminum-magnesium hydroxide-simethicone 200-200-20 mg/5 mL suspension 30 mL QID PRN    aspirin EC tablet 81 mg Daily    atorvastatin tablet 80 mg Daily    dextrose 10% bolus 125 mL 125 mL PRN    dextrose  10% bolus 250 mL 250 mL PRN    famotidine tablet 20 mg Daily PRN    fluticasone propionate 50 mcg/actuation nasal spray 50 mcg Daily PRN    furosemide injection 80 mg BID    glucagon (human recombinant) injection 1 mg PRN    glucose chewable tablet 16 g PRN    glucose chewable tablet 24 g PRN    heparin (porcine) injection 5,000 Units Q8H    insulin aspart U-100 pen 0-5 Units QID (AC + HS) PRN    insulin detemir U-100 (Levemir) pen 8 Units BID    melatonin tablet 6 mg Nightly PRN    metOLazone tablet 5 mg Before breakfast    naloxone 0.4 mg/mL injection 0.02 mg PRN    NIFEdipine 24 hr tablet 90 mg Daily    ondansetron injection 4 mg Q8H PRN    polyethylene glycol packet 17 g BID PRN    prochlorperazine injection Soln 5 mg Q6H PRN    senna-docusate 8.6-50 mg per tablet 1 tablet Daily PRN    sodium chloride 0.9% flush 10 mL Q6H PRN     Family History    None       Tobacco Use    Smoking status: Not on file    Smokeless tobacco: Not on file   Substance and Sexual Activity    Alcohol use: Not on file    Drug use: Not on file    Sexual activity: Not on file     Review of Systems   Constitutional:  Positive for unexpected weight change.   HENT: Negative.     Eyes: Negative.    Respiratory:  Positive for shortness of breath. Negative for cough, chest tightness and wheezing.    Cardiovascular:  Positive for leg swelling. Negative for chest pain and palpitations.   Gastrointestinal:  Negative for abdominal distention, abdominal pain, diarrhea and nausea.   Endocrine: Negative.    Genitourinary:  Negative for decreased urine volume, dysuria and flank pain.   Musculoskeletal: Negative.    Skin: Negative.    Neurological: Negative.  Negative for weakness.   Hematological: Negative.    Psychiatric/Behavioral: Negative.       Objective:     Vital Signs (Most Recent):  Temp: 98.6 °F (37 °C) (03/21/24 1110)  Pulse: 72 (03/21/24 1115)  Resp: 18 (03/21/24 1110)  BP: (!) 144/71 (03/21/24 1110)  SpO2: 96 % (03/21/24 1110) Vital Signs  (24h Range):  Temp:  [97.8 °F (36.6 °C)-98.6 °F (37 °C)] 98.6 °F (37 °C)  Pulse:  [71-86] 72  Resp:  [18-22] 18  SpO2:  [93 %-98 %] 96 %  BP: (138-167)/(65-77) 144/71     Weight: 85.3 kg (188 lb) (03/21/24 1115)  Body mass index is 31.28 kg/m².  Body surface area is 1.98 meters squared.    I/O last 3 completed shifts:  In: 350 [P.O.:350]  Out: 1480 [Urine:1480]     Physical Exam  Constitutional:       General: He is not in acute distress.  HENT:      Nose: Nose normal.      Mouth/Throat:      Mouth: Mucous membranes are moist.   Eyes:      Pupils: Pupils are equal, round, and reactive to light.   Cardiovascular:      Rate and Rhythm: Normal rate and regular rhythm.   Pulmonary:      Effort: Pulmonary effort is normal. No respiratory distress.      Breath sounds: Rales present.      Comments: Rales BL lung bases  Abdominal:      General: Abdomen is flat. There is no distension.      Palpations: Abdomen is soft.      Tenderness: There is no abdominal tenderness. There is no right CVA tenderness or left CVA tenderness.   Musculoskeletal:      Cervical back: Normal range of motion.      Right lower leg: Edema (2+ to knee) present.      Left lower leg: Edema (2+ to knee) present.   Skin:     General: Skin is warm and dry.   Neurological:      Mental Status: He is alert and oriented to person, place, and time.          Significant Labs:  All labs within the past 24 hours have been reviewed.    Significant Imaging:

## 2024-03-21 NOTE — H&P
"[facility] - Med Surg ([address] Medicine  History & Physical    Patient Name: Chon Simon  MRN: [id number]  Patient Class: IP- Inpatient  Admission Date: 3/20/2024  Attending Physician: [facility]  Admitting Physician: Jong Malhotra MD  Primary Care Provider: [facility] Primary Doctor         Patient information was obtained from patient, past medical records, ER records, and Care everywhere records .     Subjective:     Principal Problem:Acute pulmonary edema    Chief Complaint:   Chief Complaint   Patient presents with    Shortness of Breath     Sob with mild exertion; recent med change (losartan and carvedilol) been feeling strange ever since then. "Trying to walk is difficult". Denies chest pain        HPI: 68-year-old AAM with type 2 diabetes, CKD stage 4, hypertension, ENMANUEL, CAD, hx Stroke w/ left hemiparesis presents to the emergency department with complaints of shortness of breath and dyspnea on exertion.      He is visiting [facility] from [facility], has been in the area for the last week, traveled here for a .  Reports over the last 2-3 weeks he has been experiencing shortness of breath but in the last week his symptoms are worse with dyspnea on exertion and reduced exercise tolerance.  He has noted since his arrival to [place] he has had upwards of 10 lb weight gain and notes that with ambulation he can not tolerate activity longer than 5-10 minutes.  He does have lower extremity or peripheral edema.  He denies any chest pain or chest pressure.  No nausea, diaphoresis or neck discomfort.  He has no known diagnosis of heart failure has had prior stress testing.      He reports previously being on diuretics, furosemide as an outpatient but stopped this a few months ago for concern it was causing worsening renal function, this was in conjunction with his medical or nephrology team.  On ED workup today chest x-ray is concerning for bilateral airspace " disease suspected edema.  He has no fevers chills, cough or productive cough symptoms, flu COVID RSV testing is negative.  He does feel that he has been ingesting more salt intake and potentially more fluid intake since his arrival.  He reports his he is already moved back his departure flight to Saturday 3/23, and he is anxious about being able to leave the hospital in order to make his flight.      He was given Lasix 40 mg IV in the emergency department with only 350 cc urine output, an additional Lasix 80 mg IV given after patient arrived to medical floor.  He has been placed on 1 L oxygen since arrival.    He lives in [place], with his wife, he is on disability/SSI.  Performs ADL at home uses a cane to aid ambulation.  No active tobacco alcohol or drug use.  He has a remote history of prior cocaine 25+ years ago.    Past Medical History:   Diagnosis Date    Anemia due to chronic kidney disease 03/20/2024    Coronary artery disease involving native coronary artery without angina pectoris 07/27/2023     Cardiology stress test done and normal with previous signs of CAD in 06/2023   _ Recommend beta blocker       TTE on 5/9/2023 showed normal biventricular function with LVEF 55%. Wall motion assessment was difficult as Definity was not given. Lexiscan MPI on 5/18/2023 showed no evidence of ischemia. However, there was evidence of a medium to large size infarction of the inferolateral with some ext    CVA (cerebral vascular accident) 03/20/2024    Formatting of this note might be different from the original.   Patient left-handed, so this is significant deficit.    Diabetes mellitus     Elevated LFTs 11/10/2016    Hypertension     ENMANUEL (obstructive sleep apnea) 08/27/2019 2019 extremely severe ahi 72       Stroke     Type 2 diabetes mellitus with stage 4 chronic kidney disease, with long-term current use of insulin 11/18/2011     Medications: Lantus 25 U at bedtime and Glipizide 10 mg twice a day.    Hba1c 6.8    Complications:    - CKD stage 4 with microalbuminuria and stable, Saw Nephrology, Dr. Reinoso.    - History of DM retinopathy. Last eye exam: 12/2023   - No Neuropathy. Last foot exam: Podiatry 11/2023  with mild peripheral circulatory disorder   - History of CVA and CAD. Aspirin: Yes Statin: Yes       No past surgical history on file.    Review of patient's allergies indicates:   Allergen Reactions    Lisinopril Other (See Comments)     Other Reaction(s): Cough    cough   Other reaction(s): Cough   n/a    n/a       No current facility-administered medications on file prior to encounter.     Current Outpatient Medications on File Prior to Encounter   Medication Sig    atorvastatin (LIPITOR) 80 MG tablet Take 80 mg by mouth.    glipiZIDE (GLUCOTROL) 10 MG tablet Take 10 mg by mouth.    insulin glargine 100 units/mL SubQ pen Inject 25 Units into the skin.    NIFEdipine (PROCARDIA-XL) 90 MG (OSM) 24 hr tablet Take 90 mg by mouth.    aspirin (ECOTRIN) 81 MG EC tablet Take 1 tablet by mouth once daily.    carvediloL (COREG) 12.5 MG tablet Take 12.5 mg by mouth 2 (two) times daily.    carvediloL (COREG) 6.25 MG tablet Take 6.25 mg by mouth 2 (two) times daily.    diphenhydrAMINE (BENADRYL) 25 mg capsule Take 1,000 Units by mouth Daily.    docusate sodium (COLACE) 100 MG capsule Take 100 mg by mouth Daily.    famotidine (PEPCID) 20 MG tablet Take 1 tablet by mouth nightly as needed for Heartburn.    ferrous sulfate (FEOSOL) 325 mg (65 mg iron) Tab tablet Take 325 mg by mouth once daily.    fluticasone propionate (FLONASE) 50 mcg/actuation nasal spray 1 spray by Each Nostril route once daily.    furosemide (LASIX) 20 MG tablet Take 20 mg by mouth once daily.  for edema with defective kidney function    losartan (COZAAR) 25 MG tablet Take 1 tablet (25 mg total) by mouth daily for high blood pressure     Family History    None       Tobacco Use    Smoking status: Not on file    Smokeless tobacco: Not on file   Substance and  Sexual Activity    Alcohol use: Not on file    Drug use: Not on file    Sexual activity: Not on file     Review of Systems   Constitutional:  Positive for activity change. Negative for appetite change and chills.   HENT:  Negative for trouble swallowing.    Respiratory:  Positive for shortness of breath. Negative for chest tightness.    Cardiovascular:  Positive for leg swelling. Negative for chest pain and palpitations.   Gastrointestinal: Negative.    Genitourinary: Negative.    Musculoskeletal: Negative.    Psychiatric/Behavioral: Negative.       Objective:     Vital Signs (Most Recent):  Temp: 98 °F (36.7 °C) (03/20/24 2010)  Pulse: 81 (03/20/24 2010)  Resp: 18 (03/20/24 2010)  BP: (!) 167/74 (03/20/24 2010)  SpO2: 95 % (03/20/24 2030) Vital Signs (24h Range):  Temp:  [97.8 °F (36.6 °C)-98 °F (36.7 °C)] 98 °F (36.7 °C)  Pulse:  [77-81] 81  Resp:  [18-22] 18  SpO2:  [93 %-95 %] 95 %  BP: (157-167)/(72-74) 167/74     Weight: 85.3 kg (188 lb)  Body mass index is 31.28 kg/m².     Physical Exam  Vitals and nursing note reviewed.   Constitutional:       Appearance: He is obese. He is ill-appearing.   HENT:      Head: Normocephalic and atraumatic.      Mouth/Throat:      Mouth: Mucous membranes are moist.   Eyes:      General: No scleral icterus.     Pupils: Pupils are equal, round, and reactive to light.   Neck:      Comments: Unable to note the JVP  Cardiovascular:      Rate and Rhythm: Normal rate and regular rhythm.      Pulses: Normal pulses.   Pulmonary:      Effort: Pulmonary effort is normal. No respiratory distress.      Breath sounds: Examination of the right-middle field reveals rales. Examination of the right-lower field reveals rales. Examination of the left-lower field reveals rales. Rales present.   Abdominal:      General: Abdomen is protuberant.      Tenderness: There is no abdominal tenderness.   Musculoskeletal:      Cervical back: Neck supple.      Right lower leg: 3+ Edema (Dependent into the  "thighs) present.      Left lower leg: 3+ Edema (Dependent into the thighs) present.   Skin:     General: Skin is warm and dry.   Neurological:      General: No focal deficit present.      Mental Status: He is alert and oriented to person, place, and time.   Psychiatric:         Mood and Affect: Mood normal.         Behavior: Behavior normal.              CRANIAL NERVES     CN III, IV, VI   Pupils are equal, round, and reactive to light.       Significant Labs: All pertinent labs within the past 24 hours have been reviewed.  CBC:   Recent Labs   Lab 03/20/24  1734   WBC 6.46   HGB 8.8*   HCT 28.9*        CMP:   Recent Labs   Lab 03/20/24  1734      K 5.3*   *   CO2 17*   GLU 81   BUN 58*   CREATININE 3.5*   CALCIUM 8.6*   PROT 7.7   ALBUMIN 3.5   BILITOT 0.3   ALKPHOS 114   AST 18   ALT 15   ANIONGAP 8     Cardiac Markers:   Recent Labs   Lab 03/20/24  1734   *     Coagulation: No results for input(s): "PT", "INR", "APTT" in the last 48 hours.  Urine Studies:   Recent Labs   Lab 03/20/24  1848   COLORU Straw   APPEARANCEUA Clear   PHUR 5.0   SPECGRAV 1.010   PROTEINUA 2+*   GLUCUA Negative   KETONESU Negative   BILIRUBINUA Negative   OCCULTUA 1+*   NITRITE Negative   LEUKOCYTESUR Negative   RBCUA 1   WBCUA 2   BACTERIA Rare   HYALINECASTS 0       Significant Imaging: I have reviewed all pertinent imaging results/findings within the past 24 hours.    XR CHEST AP PORTABLE     CLINICAL HISTORY:  SOB; Shortness of breath     TECHNIQUE:  Single frontal view of the chest was performed.     COMPARISON:  None     FINDINGS:  The lungs are well expanded.  There are bilateral symmetric interstitial and alveolar opacities with pulmonary vascular congestion.  The pleural spaces are clear.  The cardiac silhouette is enlarged.  Osseous structures demonstrate degenerative changes.  There are right upper quadrant surgical clips.     Impression:     Pulmonary edema or multifocal pneumonia.      "   Electronically signed by: Elvis Queen  Date:                                            03/20/2024  Time:                                           18:37                       Assessment/Plan:     * Acute pulmonary edema  At this time suspect patient with hypervolemic state the etiology may be related to worsening of his chronic kidney disease and subsequent hypervolemia, also potentially symptoms of new onset heart failure.    -on chart review of care everywhere he has had a prior echo in 2023 which had normal EF, normal LV systolic function   >repeat ECHO ordered  -given lasix 40mg IV w/o adequate response - given 80mg IV lasix afterward.  Will plan for Metolazone 5mg in AM with 80mg Lasix IV BID at this time - assess response from last night and morning dose to adjust dosages, given his advanced CKD.   -supplemental oxygen as needed but wean off oxygen as he tolerates.         Acute renal failure superimposed on stage 4 chronic kidney disease  Patient with acute kidney injury/acute renal failure likely due to  pre-renal secondary to hypervolemia  MAHAMED is currently worsening. Baseline creatinine  2.7 in Jan 2024  -     Labs reviewed- Renal function/electrolytes with Estimated Creatinine Clearance: 21.3 mL/min (A) (based on SCr of 3.5 mg/dL (H)). according to latest data. Monitor urine output and serial BMP and adjust therapy as needed. Avoid nephrotoxins and renally dose meds for GFR listed above.    -hold losartan    Hyperkalemia  This patient has hyperkalemia which is uncontrolled. We will monitor for arrhythmias with EKG or continuous telemetry. We will treat the hyperkalemia with Furosemide. The likely etiology of the hyperkalemia is MAHAMED.  The patients latest potassium has been reviewed and the results are listed below  Recent Labs   Lab 03/20/24  1734   K 5.3*             Coronary artery disease involving native coronary artery without angina pectoris  Patient with known CAD , which is controlled Will  "continue ASA and Statin and monitor for S/Sx of angina/ACS. Continue to monitor on telemetry.     -hold carvedilol pending ECHO, given potential for new onset heart failure in differential diagnosis.     Type 2 diabetes mellitus with stage 4 chronic kidney disease, with long-term current use of insulin  Patient's FSGs are controlled on current medication regimen.  Last A1c reviewed-   Lab Results   Component Value Date    HGBA1C 7.5 (H) 11/09/2022     Most recent fingerstick glucose reviewed- No results for input(s): "POCTGLUCOSE" in the last 24 hours.  Current correctional scale  Low  Maintain anti-hyperglycemic dose as follows-   Antihyperglycemics (From admission, onward)      Start     Stop Route Frequency Ordered    03/20/24 2338  insulin aspart U-100 pen 0-5 Units         -- SubQ Before meals & nightly PRN 03/20/24 2238          Hold Oral hypoglycemics while patient is in the hospital.      Benign essential hypertension  Chronic, uncontrolled. Latest blood pressure and vitals reviewed-     Temp:  [97.8 °F (36.6 °C)-98.5 °F (36.9 °C)]   Pulse:  [76-86]   Resp:  [18-22]   BP: (157-167)/(72-77)   SpO2:  [93 %-95 %] .   Home meds for hypertension were reviewed and noted below.   Hypertension Medications               carvediloL (COREG) 12.5 MG tablet Take 12.5 mg by mouth 2 (two) times daily.              losartan (COZAAR) 25 MG tablet Take 1 tablet (25 mg total) by mouth daily for high blood pressure    NIFEdipine (PROCARDIA-XL) 90 MG (OSM) 24 hr tablet Take 90 mg by mouth.            While in the hospital, will manage blood pressure as follows; continue nifedipine  -holding losartan due to MAHAMED, and carvedilol until acute heart failure ruled out.     ENMANUEL (obstructive sleep apnea)  -history of severe sleep apnea but does not tolerate CPAP and is not using it as an outpatient.      Anemia due to chronic kidney disease  Chronic anemia, that is stable at this time in January his hemoglobin was 9.6, he follows with " Nephrology and has had prior iron studies.  He is intolerant of oral iron supplementation due to GI side effects.  Monitor CBC every Monday Friday          VTE Risk Mitigation (From admission, onward)           Ordered     heparin (porcine) injection 5,000 Units  Every 8 hours         03/20/24 2237     IP VTE HIGH RISK PATIENT  Once         03/20/24 2237     Place sequential compression device  Until discontinued         03/20/24 2237                          Jong Malhotra MD  Department of Hospital Medicine  [facility] ([place] [address])

## 2024-03-21 NOTE — ASSESSMENT & PLAN NOTE
Patient with acute kidney injury/acute renal failure likely due to  pre-renal secondary to hypervolemia  MAHAMED is currently worsening. Baseline creatinine  2.7 in Jan 2024  -     Labs reviewed- Renal function/electrolytes with Estimated Creatinine Clearance: 21.3 mL/min (A) (based on SCr of 3.5 mg/dL (H)). according to latest data. Monitor urine output and serial BMP and adjust therapy as needed. Avoid nephrotoxins and renally dose meds for GFR listed above.    -hold losartan

## 2024-03-21 NOTE — PLAN OF CARE
[facility] ([facility]  Initial Discharge Assessment       Primary Care Provider: No, Primary Doctor    Admission Diagnosis: Shortness of breath [R06.02]  SOB (shortness of breath) [R06.02]  Troponin I above reference range [R79.89]  MAHAMED (acute kidney injury) [N17.9]  Acute on chronic congestive heart failure, unspecified heart failure type [I50.9]    Admission Date: 3/20/2024  Expected Discharge Date: 3/23/2024    Transition of Care Barriers: (P) None    Payor: MEDICARE / Plan: MEDICARE PART A & B / Product Type: Government /     Extended Emergency Contact Information  Primary Emergency Contact: Chon Simon  Mobile Phone: [phone]  Relation: Son  Secondary Emergency Contact: Poornima Simon  Mobile Phone: [phone]  Relation: Spouse    Discharge Plan A: (P) Home  Discharge Plan B: (P) Home with family    No Pharmacies Listed    Initial Assessment (most recent)       Adult Discharge Assessment - 03/21/24 1153          Discharge Assessment    Assessment Type Discharge Planning Assessment     Confirmed/corrected address, phone number and insurance Yes (P)      Confirmed Demographics Contacted registration to update (P)    CM updated address in [facility]    Source of Information patient (P)      Communicated BREE with patient/caregiver Date not available/Unable to determine (P)      Reason For Admission Pulmonary edema (P)      People in Home spouse (P)      Facility Arrived From: home (P)      Do you expect to return to your current living situation? Yes (P)      Do you have help at home or someone to help you manage your care at home? Yes (P)      Who are your caregiver(s) and their phone number(s)? Poornimajuan m Simon, spouse, [phone] (P)      Prior to hospitilization cognitive status: Unable to Assess (P)      Current cognitive status: Alert/Oriented (P)      Walking or Climbing Stairs Difficulty yes (P)      Walking or Climbing Stairs ambulation difficulty, requires equipment (P)       Mobility Management uses straight cane (P)      Dressing/Bathing Difficulty no (P)      Home Layout Able to live on 1st floor (P)      Equipment Currently Used at Home cane, straight (P)      Readmission within 30 days? No (P)      Do you currently have service(s) that help you manage your care at home? No (P)      Do you take prescription medications? Yes (P)      Do you have prescription coverage? Yes (P)      Coverage [employer] (P)      Do you have any problems affording any of your prescribed medications? No (P)      Who is going to help you get home at discharge? he will fly back to [place] (P)      How do you get to doctors appointments? car, drives self (P)      Are you on dialysis? No (P)      Do you take coumadin? No (P)      Discharge Plan A Home (P)      Discharge Plan B Home with family (P)      DME Needed Upon Discharge  none (P)      Discharge Plan discussed with: Patient (P)      Transition of Care Barriers None (P)         Physical Activity    On average, how many days per week do you engage in moderate to strenuous exercise (like a brisk walk)? 5 days (P)      On average, how many minutes do you engage in exercise at this level? 10 min (P)         Financial Resource Strain    How hard is it for you to pay for the very basics like food, housing, medical care, and heating? Not hard at all (P)         Housing Stability    In the last 12 months, was there a time when you were not able to pay the mortgage or rent on time? No (P)      In the last 12 months, how many places have you lived? 1 (P)      In the last 12 months, was there a time when you did not have a steady place to sleep or slept in a shelter (including now)? No (P)         Transportation Needs    In the past 12 months, has lack of transportation kept you from medical appointments or from getting medications? No (P)      In the past 12 months, has lack of transportation kept you from meetings, work, or from getting things needed for daily  living? No (P)         Food Insecurity    Within the past 12 months, you worried that your food would run out before you got the money to buy more. Never true (P)      Within the past 12 months, the food you bought just didn't last and you didn't have money to get more. Never true (P)         Stress    Do you feel stress - tense, restless, nervous, or anxious, or unable to sleep at night because your mind is troubled all the time - these days? Not at all (P)         Social Connections    In a typical week, how many times do you talk on the phone with family, friends, or neighbors? More than three times a week (P)      How often do you get together with friends or relatives? More than three times a week (P)      How often do you attend Hoahaoism or Zoroastrian services? More than 4 times per year (P)      Do you belong to any clubs or organizations such as Hoahaoism groups, unions, fraternal or athletic groups, or school groups? Yes (P)      How often do you attend meetings of the clubs or organizations you belong to? More than 4 times per year (P)      Are you , , , , never , or living with a partner?  (P)         Alcohol Use    Q1: How often do you have a drink containing alcohol? Never (P)      Q2: How many drinks containing alcohol do you have on a typical day when you are drinking? Patient does not drink (P)      Q3: How often do you have six or more drinks on one occasion? Never (P)         OTHER    Name(s) of People in Home Poornima Simon, spouse, [phone] (P)                  CM spoke with pt in room.  He lives with wife in [address], came from home (is in town from [place]), plans to fly home upon d/c.  He drives himself to MD appts.  No 30 D. Readmission.  No HH, coumadin or HD.  DME: cane.  Pt independent with ADL's.    Ruby Muniz, BSN, BS, RN, CCM

## 2024-03-21 NOTE — NURSING
Nurses Note -- 4 Eyes      3/21/2024   12:14 AM      Skin assessed during: Admit      [x] No Altered Skin Integrity Present    [x]Prevention Measures Documented      [] Yes- Altered Skin Integrity Present or Discovered   [] LDA Added if Not in Epic (Describe Wound)   [] New Altered Skin Integrity was Present on Admit and Documented in LDA   [] Wound Image Taken    Wound Care Consulted? No    Attending Nurse:  CHRISTINE Aguirre    Second RN/Staff Member:  Pt refused skin assessment

## 2024-03-21 NOTE — SUBJECTIVE & OBJECTIVE
Past Medical History:   Diagnosis Date    Anemia due to chronic kidney disease 03/20/2024    Coronary artery disease involving native coronary artery without angina pectoris 07/27/2023     Cardiology stress test done and normal with previous signs of CAD in 06/2023   _ Recommend beta blocker       TTE on 5/9/2023 showed normal biventricular function with LVEF 55%. Wall motion assessment was difficult as Definity was not given. Lexiscan MPI on 5/18/2023 showed no evidence of ischemia. However, there was evidence of a medium to large size infarction of the inferolateral with some ext    CVA (cerebral vascular accident) 03/20/2024    Formatting of this note might be different from the original.   Patient left-handed, so this is significant deficit.    Diabetes mellitus     Elevated LFTs 11/10/2016    Hypertension     ENMANUEL (obstructive sleep apnea) 08/27/2019 2019 extremely severe ahi 72       Stroke     Type 2 diabetes mellitus with stage 4 chronic kidney disease, with long-term current use of insulin 11/18/2011     Medications: Lantus 25 U at bedtime and Glipizide 10 mg twice a day.    Hba1c 6.8   Complications:    - CKD stage 4 with microalbuminuria and stable, Saw Nephrology, Dr. Reinoso.    - History of DM retinopathy. Last eye exam: 12/2023   - No Neuropathy. Last foot exam: Podiatry 11/2023  with mild peripheral circulatory disorder   - History of CVA and CAD. Aspirin: Yes Statin: Yes       No past surgical history on file.    Review of patient's allergies indicates:   Allergen Reactions    Lisinopril Other (See Comments)     Other Reaction(s): Cough    cough   Other reaction(s): Cough   n/a    n/a       No current facility-administered medications on file prior to encounter.     Current Outpatient Medications on File Prior to Encounter   Medication Sig    atorvastatin (LIPITOR) 80 MG tablet Take 80 mg by mouth.    glipiZIDE (GLUCOTROL) 10 MG tablet Take 10 mg by mouth.    insulin glargine 100 units/mL SubQ  pen Inject 25 Units into the skin.    NIFEdipine (PROCARDIA-XL) 90 MG (OSM) 24 hr tablet Take 90 mg by mouth.    aspirin (ECOTRIN) 81 MG EC tablet Take 1 tablet by mouth once daily.    carvediloL (COREG) 12.5 MG tablet Take 12.5 mg by mouth 2 (two) times daily.    carvediloL (COREG) 6.25 MG tablet Take 6.25 mg by mouth 2 (two) times daily.    diphenhydrAMINE (BENADRYL) 25 mg capsule Take 1,000 Units by mouth Daily.    docusate sodium (COLACE) 100 MG capsule Take 100 mg by mouth Daily.    famotidine (PEPCID) 20 MG tablet Take 1 tablet by mouth nightly as needed for Heartburn.    ferrous sulfate (FEOSOL) 325 mg (65 mg iron) Tab tablet Take 325 mg by mouth once daily.    fluticasone propionate (FLONASE) 50 mcg/actuation nasal spray 1 spray by Each Nostril route once daily.    furosemide (LASIX) 20 MG tablet Take 20 mg by mouth once daily.  for edema with defective kidney function    losartan (COZAAR) 25 MG tablet Take 1 tablet (25 mg total) by mouth daily for high blood pressure     Family History    None       Tobacco Use    Smoking status: Not on file    Smokeless tobacco: Not on file   Substance and Sexual Activity    Alcohol use: Not on file    Drug use: Not on file    Sexual activity: Not on file     Review of Systems   Constitutional:  Positive for activity change. Negative for appetite change and chills.   HENT:  Negative for trouble swallowing.    Respiratory:  Positive for shortness of breath. Negative for chest tightness.    Cardiovascular:  Positive for leg swelling. Negative for chest pain and palpitations.   Gastrointestinal: Negative.    Genitourinary: Negative.    Musculoskeletal: Negative.    Psychiatric/Behavioral: Negative.       Objective:     Vital Signs (Most Recent):  Temp: 98 °F (36.7 °C) (03/20/24 2010)  Pulse: 81 (03/20/24 2010)  Resp: 18 (03/20/24 2010)  BP: (!) 167/74 (03/20/24 2010)  SpO2: 95 % (03/20/24 2030) Vital Signs (24h Range):  Temp:  [97.8 °F (36.6 °C)-98 °F (36.7 °C)] 98 °F (36.7  °C)  Pulse:  [77-81] 81  Resp:  [18-22] 18  SpO2:  [93 %-95 %] 95 %  BP: (157-167)/(72-74) 167/74     Weight: 85.3 kg (188 lb)  Body mass index is 31.28 kg/m².     Physical Exam  Vitals and nursing note reviewed.   Constitutional:       Appearance: He is obese. He is ill-appearing.   HENT:      Head: Normocephalic and atraumatic.      Mouth/Throat:      Mouth: Mucous membranes are moist.   Eyes:      General: No scleral icterus.     Pupils: Pupils are equal, round, and reactive to light.   Neck:      Comments: Unable to note the JVP  Cardiovascular:      Rate and Rhythm: Normal rate and regular rhythm.      Pulses: Normal pulses.   Pulmonary:      Effort: Pulmonary effort is normal. No respiratory distress.      Breath sounds: Examination of the right-middle field reveals rales. Examination of the right-lower field reveals rales. Examination of the left-lower field reveals rales. Rales present.   Abdominal:      General: Abdomen is protuberant.      Tenderness: There is no abdominal tenderness.   Musculoskeletal:      Cervical back: Neck supple.      Right lower leg: 3+ Edema (Dependent into the thighs) present.      Left lower leg: 3+ Edema (Dependent into the thighs) present.   Skin:     General: Skin is warm and dry.   Neurological:      General: No focal deficit present.      Mental Status: He is alert and oriented to person, place, and time.   Psychiatric:         Mood and Affect: Mood normal.         Behavior: Behavior normal.              CRANIAL NERVES     CN III, IV, VI   Pupils are equal, round, and reactive to light.       Significant Labs: All pertinent labs within the past 24 hours have been reviewed.  CBC:   Recent Labs   Lab 03/20/24  1734   WBC 6.46   HGB 8.8*   HCT 28.9*        CMP:   Recent Labs   Lab 03/20/24  1734      K 5.3*   *   CO2 17*   GLU 81   BUN 58*   CREATININE 3.5*   CALCIUM 8.6*   PROT 7.7   ALBUMIN 3.5   BILITOT 0.3   ALKPHOS 114   AST 18   ALT 15   ANIONGAP 8  "    Cardiac Markers:   Recent Labs   Lab 03/20/24  1734   *     Coagulation: No results for input(s): "PT", "INR", "APTT" in the last 48 hours.  Urine Studies:   Recent Labs   Lab 03/20/24  1848   COLORU Straw   APPEARANCEUA Clear   PHUR 5.0   SPECGRAV 1.010   PROTEINUA 2+*   GLUCUA Negative   KETONESU Negative   BILIRUBINUA Negative   OCCULTUA 1+*   NITRITE Negative   LEUKOCYTESUR Negative   RBCUA 1   WBCUA 2   BACTERIA Rare   HYALINECASTS 0       Significant Imaging: I have reviewed all pertinent imaging results/findings within the past 24 hours.    XR CHEST AP PORTABLE     CLINICAL HISTORY:  SOB; Shortness of breath     TECHNIQUE:  Single frontal view of the chest was performed.     COMPARISON:  None     FINDINGS:  The lungs are well expanded.  There are bilateral symmetric interstitial and alveolar opacities with pulmonary vascular congestion.  The pleural spaces are clear.  The cardiac silhouette is enlarged.  Osseous structures demonstrate degenerative changes.  There are right upper quadrant surgical clips.     Impression:     Pulmonary edema or multifocal pneumonia.        Electronically signed by: Elvis Queen  Date:                                            03/20/2024  Time:                                           18:37                       "

## 2024-03-21 NOTE — PLAN OF CARE
Problem: Adult Inpatient Plan of Care  Goal: Plan of Care Review  Outcome: Ongoing, Progressing  Goal: Optimal Comfort and Wellbeing  Outcome: Ongoing, Progressing     Problem: Diabetes Comorbidity  Goal: Blood Glucose Level Within Targeted Range  Outcome: Ongoing, Progressing     Problem: Cardiac Output Decreased  Goal: Optimal Cardiac Output  Outcome: Ongoing, Progressing     Problem: Fluid Imbalance  Goal: Fluid Balance  Outcome: Ongoing, Progressing     Problem: Respiratory Compromise  Goal: Effective Oxygenation and Ventilation  Outcome: Ongoing, Progressing     Problem: Fall Injury Risk  Goal: Absence of Fall and Fall-Related Injury  Outcome: Ongoing, Progressing

## 2024-03-21 NOTE — ASSESSMENT & PLAN NOTE
69 y/o M w/ PMH DM II, CKD IV (baseline Cr 2.7), HTN, ENMANUEL, CAD, hx stroke w/ left hemiparesis who [facility] with SOB and ORTEGA. Patient notes feeling SOB and becoming dyspneic with minimal exertion over the past several weeks, and also states that he has gained roughly 10 lbs in that span of time. Endorses eating increased quantity of salt recently, more fluid than normal. States that he was urinating more than normal prior to presentation. Patient has been on furosemide in the past but was recently discontinued (patient states that he was discontinued because it was causing his renal function to decline). CXR upon admission showed signs of pulm edema, and patient was administered 40 mg IV lasix in ED but only had 350 mL UOP, dose was increased to 80 mg IV BID w/ addition of Metolazone 5mg qd. UOP during first day of admission charted at 1480 mL. Labs on admission notable for Cr 3.5 (BL 2.7), BUN 58, K 5.3, Bicarb 17. UA remarkable for 2+ protein, 1+ occult blood. UPCR in January elevated to 2.19. BNP elevated at 429. Hg A1C 6.3. Echo following admission shows EF 50-55%, low normal systolic function, venous pressure 15. On Losartan at home, held on admission. No recent vomiting or diarrhea. No NSAID use. Nephrology consulted for MAHAMED on CKD.    Clinical picture of HF exacerbation despite low normal EF, normal diastolic function. Suggest continuing current diuresis regimen. Cr may increase temporarily as diuresis is achieved; will allow for some degree of permissive hypercreatinemia. Will possibly assess venous pressure with POC US in coming days as assessment of volume status.    Plan:  -Continue to diurese w/ IV Lasix 80 mg BID and metolazone 5mg daily  -Daily CMP  -Strict Is and Os  -Avoid nephrotoxic substances

## 2024-03-21 NOTE — HPI
69 y/o M w/ PMH DM II, CKD IV (baseline Cr 2.7), HTN, ENMANUEL, CAD, hx stroke w/ left hemiparesis who presented to [facility] with SOB and ORTEGA. Patient notes feeling SOB and becoming dyspneic with minimal exertion over the past several weeks, and also states that he has gained roughly 10 lbs in that span of time. Endorses eating increased quantity of salt recently, more fluid than normal. States that he was urinating more than normal prior to presentation. Patient has been on furosemide in the past but was recently discontinued (patient states that he was discontinued because it was causing his renal function to decline). CXR upon admission showed signs of pulm edema, and patient was administered 40 mg IV lasix in ED but only had 350 mL UOP, dose was increased to 80 mg IV BID w/ addition of Metolazone 5mg qd. UOP during first day of admission charted at 1480 mL. Labs on admission notable for Cr 3.5 (BL 2.7), BUN 58, K 5.3, Bicarb 17. UA remarkable for 2+ protein, 1+ occult blood. UPCR in January elevated to 2.19. BNP elevated at 429. Hg A1C 6.3. Echo following admission shows EF 50-55%, low normal systolic function. On Losartan at home, held on admission. No recent vomiting or diarrhea. No NSAID use. Nephrology consulted for MAHAMED on CKD.

## 2024-03-21 NOTE — ASSESSMENT & PLAN NOTE
Chronic anemia, that is stable at this time in January his hemoglobin was 9.6, he follows with Nephrology and has had prior iron studies.  He is intolerant of oral iron supplementation due to GI side effects.  Monitor CBC every Monday Friday

## 2024-03-21 NOTE — CONSULTS
[facility] ([place] [address])  Nephrology  Consult Note    Patient Name: Chon Simon  MRN: [id number]  Admission Date: 3/20/2024  Hospital Length of Stay: 1 days  Attending Provider: Theresa John MD   Primary Care Physician: Louisa, Primary Doctor  Principal Problem:Acute pulmonary edema    Inpatient consult to Nephrology  Consult performed by: Kiran Kapoor MD  Consult ordered by: Theresa John MD        Subjective:     HPI: 69 y/o M w/ PMH DM II, CKD IV (baseline Cr 2.7), HTN, ENMANUEL, CAD, hx stroke w/ left hemiparesis who presented to [facility] with SOB and ORTEGA. Patient notes feeling SOB and becoming dyspneic with minimal exertion over the past several weeks, and also states that he has gained roughly 10 lbs in that span of time. Endorses eating increased quantity of salt recently, more fluid than normal. States that he was urinating more than normal prior to presentation. Patient has been on furosemide in the past but was recently discontinued (patient states that he was discontinued because it was causing his renal function to decline). CXR upon admission showed signs of pulm edema, and patient was administered 40 mg IV lasix in ED but only had 350 mL UOP, dose was increased to 80 mg IV BID w/ addition of Metolazone 5mg qd. UOP during first day of admission charted at 1480 mL. Labs on admission notable for Cr 3.5 (BL 2.7), BUN 58, K 5.3, Bicarb 17. UA remarkable for 2+ protein, 1+ occult blood. UPCR in January elevated to 2.19. BNP elevated at 429. Hg A1C 6.3. Echo following admission shows EF 50-55%, low normal systolic function. On Losartan at home, held on admission. No recent vomiting or diarrhea. No NSAID use. Nephrology consulted for MAHAMED on CKD.    Past Medical History:   Diagnosis Date    Anemia due to chronic kidney disease 03/20/2024    Coronary artery disease involving native coronary artery without angina pectoris 07/27/2023     Cardiology stress test done and normal with previous signs of  CAD in 06/2023   _ Recommend beta blocker       TTE on 5/9/2023 showed normal biventricular function with LVEF 55%. Wall motion assessment was difficult as Definity was not given. Lexiscan MPI on 5/18/2023 showed no evidence of ischemia. However, there was evidence of a medium to large size infarction of the inferolateral with some ext    CVA (cerebral vascular accident) 03/20/2024    Formatting of this note might be different from the original.   Patient left-handed, so this is significant deficit.    Diabetes mellitus     Elevated LFTs 11/10/2016    Hypertension     ENMANUEL (obstructive sleep apnea) 08/27/2019 2019 extremely severe ahi 72       Stroke     Type 2 diabetes mellitus with stage 4 chronic kidney disease, with long-term current use of insulin 11/18/2011     Medications: Lantus 25 U at bedtime and Glipizide 10 mg twice a day.    Hba1c 6.8   Complications:    - CKD stage 4 with microalbuminuria and stable, Saw Nephrology, Dr. Reinoso.    - History of DM retinopathy. Last eye exam: 12/2023   - No Neuropathy. Last foot exam: Podiatry 11/2023  with mild peripheral circulatory disorder   - History of CVA and CAD. Aspirin: Yes Statin: Yes       No past surgical history on file.    Review of patient's allergies indicates:   Allergen Reactions    Lisinopril Other (See Comments)     Other Reaction(s): Cough    cough   Other reaction(s): Cough   n/a    n/a     Current Facility-Administered Medications   Medication Frequency    acetaminophen tablet 650 mg Q4H PRN    aluminum-magnesium hydroxide-simethicone 200-200-20 mg/5 mL suspension 30 mL QID PRN    aspirin EC tablet 81 mg Daily    atorvastatin tablet 80 mg Daily    dextrose 10% bolus 125 mL 125 mL PRN    dextrose 10% bolus 250 mL 250 mL PRN    famotidine tablet 20 mg Daily PRN    fluticasone propionate 50 mcg/actuation nasal spray 50 mcg Daily PRN    furosemide injection 80 mg BID    glucagon (human recombinant) injection 1 mg PRN    glucose chewable tablet 16  g PRN    glucose chewable tablet 24 g PRN    heparin (porcine) injection 5,000 Units Q8H    insulin aspart U-100 pen 0-5 Units QID (AC + HS) PRN    insulin detemir U-100 (Levemir) pen 8 Units BID    melatonin tablet 6 mg Nightly PRN    metOLazone tablet 5 mg Before breakfast    naloxone 0.4 mg/mL injection 0.02 mg PRN    NIFEdipine 24 hr tablet 90 mg Daily    ondansetron injection 4 mg Q8H PRN    polyethylene glycol packet 17 g BID PRN    prochlorperazine injection Soln 5 mg Q6H PRN    senna-docusate 8.6-50 mg per tablet 1 tablet Daily PRN    sodium chloride 0.9% flush 10 mL Q6H PRN     Family History    None       Tobacco Use    Smoking status: Not on file    Smokeless tobacco: Not on file   Substance and Sexual Activity    Alcohol use: Not on file    Drug use: Not on file    Sexual activity: Not on file     Review of Systems   Constitutional:  Positive for unexpected weight change.   HENT: Negative.     Eyes: Negative.    Respiratory:  Positive for shortness of breath. Negative for cough, chest tightness and wheezing.    Cardiovascular:  Positive for leg swelling. Negative for chest pain and palpitations.   Gastrointestinal:  Negative for abdominal distention, abdominal pain, diarrhea and nausea.   Endocrine: Negative.    Genitourinary:  Negative for decreased urine volume, dysuria and flank pain.   Musculoskeletal: Negative.    Skin: Negative.    Neurological: Negative.  Negative for weakness.   Hematological: Negative.    Psychiatric/Behavioral: Negative.       Objective:     Vital Signs (Most Recent):  Temp: 98.6 °F (37 °C) (03/21/24 1110)  Pulse: 72 (03/21/24 1115)  Resp: 18 (03/21/24 1110)  BP: (!) 144/71 (03/21/24 1110)  SpO2: 96 % (03/21/24 1110) Vital Signs (24h Range):  Temp:  [97.8 °F (36.6 °C)-98.6 °F (37 °C)] 98.6 °F (37 °C)  Pulse:  [71-86] 72  Resp:  [18-22] 18  SpO2:  [93 %-98 %] 96 %  BP: (138-167)/(65-77) 144/71     Weight: 85.3 kg (188 lb) (03/21/24 1115)  Body mass index is 31.28 kg/m².  Body  surface area is 1.98 meters squared.    I/O last 3 completed shifts:  In: 350 [P.O.:350]  Out: 1480 [Urine:1480]     Physical Exam  Constitutional:       General: He is not in acute distress.  HENT:      Nose: Nose normal.      Mouth/Throat:      Mouth: Mucous membranes are moist.   Eyes:      Pupils: Pupils are equal, round, and reactive to light.   Cardiovascular:      Rate and Rhythm: Normal rate and regular rhythm.   Pulmonary:      Effort: Pulmonary effort is normal. No respiratory distress.      Breath sounds: Rales present.      Comments: Rales BL lung bases  Abdominal:      General: Abdomen is flat. There is no distension.      Palpations: Abdomen is soft.      Tenderness: There is no abdominal tenderness. There is no right CVA tenderness or left CVA tenderness.   Musculoskeletal:      Cervical back: Normal range of motion.      Right lower leg: Edema (2+ to knee) present.      Left lower leg: Edema (2+ to knee) present.   Skin:     General: Skin is warm and dry.   Neurological:      Mental Status: He is alert and oriented to person, place, and time.          Significant Labs:  All labs within the past 24 hours have been reviewed.    Significant Imaging:     Assessment/Plan:     Renal/  Acute renal failure superimposed on stage 4 chronic kidney disease  67 y/o M w/ PMH DM II, CKD IV (baseline Cr 2.7), HTN, ENMANUEL, CAD, hx stroke w/ left hemiparesis who presented to [facility] with SOB and ORTEGA. Patient notes feeling SOB and becoming dyspneic with minimal exertion over the past several weeks, and also states that he has gained roughly 10 lbs in that span of time. Endorses eating increased quantity of salt recently, more fluid than normal. States that he was urinating more than normal prior to presentation. Patient has been on furosemide in the past but was recently discontinued (patient states that he was discontinued because it was causing his renal function to decline). CXR upon admission showed signs of pulm edema,  and patient was administered 40 mg IV lasix in ED but only had 350 mL UOP, dose was increased to 80 mg IV BID w/ addition of Metolazone 5mg qd. UOP during first day of admission charted at 1480 mL. Labs on admission notable for Cr 3.5 (BL 2.7), BUN 58, K 5.3, Bicarb 17. UA remarkable for 2+ protein, 1+ occult blood. UPCR in January elevated to 2.19. BNP elevated at 429. Hg A1C 6.3. Echo following admission shows EF 50-55%, low normal systolic function, venous pressure 15. On Losartan at home, held on admission. No recent vomiting or diarrhea. No NSAID use. Nephrology consulted for MAHAMED on CKD.    Clinical picture of HF exacerbation despite low normal EF, normal diastolic function. Suggest continuing current diuresis regimen. Cr may increase temporarily as diuresis is achieved; will allow for some degree of permissive hypercreatinemia. Will possibly assess venous pressure with POC US in coming days as assessment of volume status.    Plan:  -Continue to diurese w/ IV Lasix 80 mg BID and metolazone 5mg daily  -Daily CMP  -Strict Is and Os  -Avoid nephrotoxic substances        Thank you for your consult. I will follow-up with patient. Please contact us if you have any additional questions.    Kiran Harper MD  Nephrology  [facility] ([facility]    ATTENDING PHYSICIAN ATTESTATION  I have personally verified the history and examined the patient. I thoroughly reviewed the demographic, clinical, laboratorial and imaging information available in medical records. I agree with the assessment and recommendations provided by the subspecialty resident who was under my supervision.

## 2024-03-21 NOTE — NURSING
"Pt refused skin assessment in the back, sacral, thigh and groin area. Pt verbalized "I don't have any wounds.".  "

## 2024-03-21 NOTE — NURSING
Received pt awake, alert, oriented x4 via wheelchair from ED. Pt appeared short of breath. Pt's o2 sat at 94% on room air. Pt hooked to o2 at 1lpm, sats at 97-98%. Radial pulse regular, abdominal sounds active, and lung sounds diminished in all quadrants. Pt w/ 20g PIV in the right AC intact. Noted edema on the bilateral ankles. Pt instructed to call for help/assistance before ambulating. Pt educated on diet and fluid restrictions. Pt oriented to room and call light. All needs attended.

## 2024-03-21 NOTE — HPI
68-year-old AAM with type 2 diabetes, CKD stage 4, hypertension, ENMANUEL, CAD, hx Stroke w/ left hemiparesis presents to the emergency department with complaints of shortness of breath and dyspnea on exertion.      He is visiting [facility] from [facility], has been in the area for the last week, traveled here for a .  Reports over the last 2-3 weeks he has been experiencing shortness of breath but in the last week his symptoms are worse with dyspnea on exertion and reduced exercise tolerance.  He has noted since his arrival to [place] he has had upwards of 10 lb weight gain and notes that with ambulation he can not tolerate activity longer than 5-10 minutes.  He does have lower extremity or peripheral edema.  He denies any chest pain or chest pressure.  No nausea, diaphoresis or neck discomfort.  He has no known diagnosis of heart failure has had prior stress testing.      He reports previously being on diuretics, furosemide as an outpatient but stopped this a few months ago for concern it was causing worsening renal function, this was in conjunction with his medical or nephrology team.  On ED workup today chest x-ray is concerning for bilateral airspace disease suspected edema.  He has no fevers chills, cough or productive cough symptoms, flu COVID RSV testing is negative.  He does feel that he has been ingesting more salt intake and potentially more fluid intake since his arrival.  He reports his he is already moved back his departure flight to Saturday 3/23, and he is anxious about being able to leave the hospital in order to make his flight.      He was given Lasix 40 mg IV in the emergency department with only 350 cc urine output, an additional Lasix 80 mg IV given after patient arrived to medical floor.  He has been placed on 1 L oxygen since arrival.    He lives in [place], with his wife, he is on disability/SSI.  Performs ADL at home uses a cane to aid ambulation.  No  active tobacco alcohol or drug use.  He has a remote history of prior cocaine 25+ years ago.

## 2024-03-21 NOTE — PROGRESS NOTES
[facility] - Med S[facility] ([address] Medicine  Progress Note    Patient Name: Chon Simon  MRN: [id number]  Patient Class: IP- Inpatient   Admission Date: 3/20/2024  Length of Stay: 1 days  Attending Physician: Theresa John MD  Primary Care Provider: Louisa, Primary Doctor        Subjective:     Principal Problem:Acute pulmonary edema        HPI:  68-year-old AAM with type 2 diabetes, CKD stage 4, hypertension, ENMANUEL, CAD, hx Stroke w/ left hemiparesis presents to the emergency department with complaints of shortness of breath and dyspnea on exertion.      He is visiting [facility] from [facility], has been in the area for the last week, traveled here for a .  Reports over the last 2-3 weeks he has been experiencing shortness of breath but in the last week his symptoms are worse with dyspnea on exertion and reduced exercise tolerance.  He has noted since his arrival to [place] he has had upwards of 10 lb weight gain and notes that with ambulation he can not tolerate activity longer than 5-10 minutes.  He does have lower extremity or peripheral edema.  He denies any chest pain or chest pressure.  No nausea, diaphoresis or neck discomfort.  He has no known diagnosis of heart failure has had prior stress testing.      He reports previously being on diuretics, furosemide as an outpatient but stopped this a few months ago for concern it was causing worsening renal function, this was in conjunction with his medical or nephrology team.  On ED workup today chest x-ray is concerning for bilateral airspace disease suspected edema.  He has no fevers chills, cough or productive cough symptoms, flu COVID RSV testing is negative.  He does feel that he has been ingesting more salt intake and potentially more fluid intake since his arrival.  He reports his he is already moved back his departure flight to Saturday 3/23, and he is anxious about being able to leave the hospital in order  to make his flight.      He was given Lasix 40 mg IV in the emergency department with only 350 cc urine output, an additional Lasix 80 mg IV given after patient arrived to medical floor.  He has been placed on 1 L oxygen since arrival.    He lives in [place], with his wife, he is on disability/SSI.  Performs ADL at home uses a cane to aid ambulation.  No active tobacco alcohol or drug use.  He has a remote history of prior cocaine 25+ years ago.    Overview/Hospital Course:  No notes on file    Interval History:   3/21: renal function remains stable. Patient feels a bit better. Notes improvement in leg edema with diuresis.     Review of Systems   Constitutional:  Positive for activity change. Negative for appetite change and chills.   HENT:  Negative for trouble swallowing.    Respiratory:  Positive for shortness of breath. Negative for chest tightness.    Cardiovascular:  Positive for leg swelling. Negative for chest pain and palpitations.   Gastrointestinal: Negative.    Genitourinary: Negative.    Musculoskeletal: Negative.    Psychiatric/Behavioral: Negative.       Objective:     Vital Signs (Most Recent):  Temp: 97.6 °F (36.4 °C) (03/21/24 1440)  Pulse: 69 (03/21/24 1512)  Resp: 18 (03/21/24 1440)  BP: (!) 155/73 (03/21/24 1440)  SpO2: 97 % (03/21/24 1440) Vital Signs (24h Range):  Temp:  [97.6 °F (36.4 °C)-98.6 °F (37 °C)] 97.6 °F (36.4 °C)  Pulse:  [69-86] 69  Resp:  [18-20] 18  SpO2:  [93 %-98 %] 97 %  BP: (138-167)/(65-77) 155/73     Weight: 85.3 kg (188 lb)  Body mass index is 31.28 kg/m².    Intake/Output Summary (Last 24 hours) at 3/21/2024 1629  Last data filed at 3/21/2024 0606  Gross per 24 hour   Intake 350 ml   Output 1480 ml   Net -1130 ml         Physical Exam  Vitals and nursing note reviewed.   Constitutional:       Appearance: He is obese. He is ill-appearing.   HENT:      Head: Normocephalic and atraumatic.      Mouth/Throat:      Mouth: Mucous membranes are moist.   Eyes:      General: No  scleral icterus.     Pupils: Pupils are equal, round, and reactive to light.   Neck:      Comments: Unable to note the JVP  Cardiovascular:      Rate and Rhythm: Normal rate and regular rhythm.      Pulses: Normal pulses.   Pulmonary:      Effort: Pulmonary effort is normal. No respiratory distress.      Breath sounds: Examination of the right-middle field reveals rales. Examination of the right-lower field reveals rales. Examination of the left-lower field reveals rales. Rales present.   Abdominal:      General: Abdomen is protuberant.      Tenderness: There is no abdominal tenderness.   Musculoskeletal:      Cervical back: Neck supple.      Right lower leg: 3+ Edema (Dependent into the thighs) present.      Left lower leg: 3+ Edema (Dependent into the thighs) present.   Skin:     General: Skin is warm and dry.   Neurological:      General: No focal deficit present.      Mental Status: He is alert and oriented to person, place, and time.   Psychiatric:         Mood and Affect: Mood normal.         Behavior: Behavior normal.             Significant Labs: All pertinent labs within the past 24 hours have been reviewed.    Significant Imaging: I have reviewed all pertinent imaging results/findings within the past 24 hours.    Assessment/Plan:      * Acute pulmonary edema  At this time suspect patient with hypervolemic state the etiology may be related to worsening of his chronic kidney disease and subsequent hypervolemia, also potentially symptoms of new onset heart failure.    -on chart review of care everywhere he has had a prior echo in 2023 which had normal EF, normal LV systolic function   >repeat ECHO ordered  -given lasix 40mg IV w/o adequate response - given 80mg IV lasix afterward.  Will plan for Metolazone 5mg in AM with 80mg Lasix IV BID at this time - assess response from last night and morning dose to adjust dosages, given his advanced CKD.   -supplemental oxygen as needed but wean off oxygen as he  "tolerates.         Anemia due to chronic kidney disease  Chronic anemia, that is stable at this time in January his hemoglobin was 9.6, he follows with Nephrology and has had prior iron studies.  He is intolerant of oral iron supplementation due to GI side effects.  Monitor CBC every Monday Friday        Hyperkalemia  This patient has hyperkalemia which is uncontrolled. We will monitor for arrhythmias with EKG or continuous telemetry. We will treat the hyperkalemia with Furosemide. The likely etiology of the hyperkalemia is MAHAMED.  The patients latest potassium has been reviewed and the results are listed below  Recent Labs   Lab 03/20/24  1734   K 5.3*             Acute renal failure superimposed on stage 4 chronic kidney disease  Patient with acute kidney injury/acute renal failure likely due to  pre-renal secondary to hypervolemia  MAHAMED is currently worsening. Baseline creatinine  2.7 in Jan 2024  -     Labs reviewed- Renal function/electrolytes with Estimated Creatinine Clearance: 21.3 mL/min (A) (based on SCr of 3.5 mg/dL (H)). according to latest data. Monitor urine output and serial BMP and adjust therapy as needed. Avoid nephrotoxins and renally dose meds for GFR listed above.    -hold losartan    Type 2 diabetes mellitus with stage 4 chronic kidney disease, with long-term current use of insulin  Patient's FSGs are controlled on current medication regimen.  Last A1c reviewed-   Lab Results   Component Value Date    HGBA1C 7.5 (H) 11/09/2022     Most recent fingerstick glucose reviewed- No results for input(s): "POCTGLUCOSE" in the last 24 hours.  Current correctional scale  Low  Maintain anti-hyperglycemic dose as follows-   Antihyperglycemics (From admission, onward)      Start     Stop Route Frequency Ordered    03/20/24 2338  insulin aspart U-100 pen 0-5 Units         -- SubQ Before meals & nightly PRN 03/20/24 2238          Hold Oral hypoglycemics while patient is in the hospital.      ENMANUEL (obstructive " sleep apnea)  -history of severe sleep apnea but does not tolerate CPAP and is not using it as an outpatient.      Coronary artery disease involving native coronary artery without angina pectoris  Patient with known CAD , which is controlled Will continue ASA and Statin and monitor for S/Sx of angina/ACS. Continue to monitor on telemetry.     -hold carvedilol pending ECHO, given potential for new onset heart failure in differential diagnosis.     Benign essential hypertension  Chronic, uncontrolled. Latest blood pressure and vitals reviewed-     Temp:  [97.8 °F (36.6 °C)-98.5 °F (36.9 °C)]   Pulse:  [76-86]   Resp:  [18-22]   BP: (157-167)/(72-77)   SpO2:  [93 %-95 %] .   Home meds for hypertension were reviewed and noted below.   Hypertension Medications               carvediloL (COREG) 12.5 MG tablet Take 12.5 mg by mouth 2 (two) times daily.              losartan (COZAAR) 25 MG tablet Take 1 tablet (25 mg total) by mouth daily for high blood pressure    NIFEdipine (PROCARDIA-XL) 90 MG (OSM) 24 hr tablet Take 90 mg by mouth.            While in the hospital, will manage blood pressure as follows; continue nifedipine  -holding losartan due to MAHAMED, and carvedilol until acute heart failure ruled out.       VTE Risk Mitigation (From admission, onward)           Ordered     heparin (porcine) injection 5,000 Units  Every 8 hours         03/20/24 2237     IP VTE HIGH RISK PATIENT  Once         03/20/24 2237     Place sequential compression device  Until discontinued         03/20/24 2237                    Discharge Planning   BREE: 3/23/2024     Code Status: Full Code   Is the patient medically ready for discharge?: No    Reason for patient still in hospital (select all that apply): Patient trending condition  Discharge Plan A: Home                  Theresa John MD  Department of Hospital Medicine   [facility] - Med Surg ([place]-[facility])

## 2024-03-21 NOTE — ASSESSMENT & PLAN NOTE
This patient has hyperkalemia which is uncontrolled. We will monitor for arrhythmias with EKG or continuous telemetry. We will treat the hyperkalemia with Furosemide. The likely etiology of the hyperkalemia is MAHAMED.  The patients latest potassium has been reviewed and the results are listed below  Recent Labs   Lab 03/20/24  1734   K 5.3*

## 2024-03-21 NOTE — ASSESSMENT & PLAN NOTE
At this time suspect patient with hypervolemic state the etiology may be related to worsening of his chronic kidney disease and subsequent hypervolemia, also potentially symptoms of new onset heart failure.    -on chart review of care everywhere he has had a prior echo in 2023 which had normal EF, normal LV systolic function   >repeat ECHO ordered  -given lasix 40mg IV w/o adequate response - given 80mg IV lasix afterward.  Will plan for Metolazone 5mg in AM with 80mg Lasix IV BID at this time - assess response from last night and morning dose to adjust dosages, given his advanced CKD.   -supplemental oxygen as needed but wean off oxygen as he tolerates.

## 2024-03-22 VITALS
WEIGHT: 188 LBS | OXYGEN SATURATION: 97 % | DIASTOLIC BLOOD PRESSURE: 78 MMHG | RESPIRATION RATE: 20 BRPM | TEMPERATURE: 98 F | HEIGHT: 65 IN | HEART RATE: 66 BPM | SYSTOLIC BLOOD PRESSURE: 151 MMHG | BODY MASS INDEX: 31.32 KG/M2

## 2024-03-22 LAB
ALBUMIN SERPL BCP-MCNC: 3.4 G/DL (ref 3.5–5.2)
ANION GAP SERPL CALC-SCNC: 12 MMOL/L (ref 8–16)
BASOPHILS # BLD AUTO: 0.01 K/UL (ref 0–0.2)
BASOPHILS NFR BLD: 0.2 % (ref 0–1.9)
BUN SERPL-MCNC: 58 MG/DL (ref 8–23)
CALCIUM SERPL-MCNC: 8.5 MG/DL (ref 8.7–10.5)
CHLORIDE SERPL-SCNC: 108 MMOL/L (ref 95–110)
CO2 SERPL-SCNC: 18 MMOL/L (ref 23–29)
CREAT SERPL-MCNC: 3.5 MG/DL (ref 0.5–1.4)
DIFFERENTIAL METHOD BLD: ABNORMAL
EOSINOPHIL # BLD AUTO: 0.2 K/UL (ref 0–0.5)
EOSINOPHIL NFR BLD: 4.2 % (ref 0–8)
ERYTHROCYTE [DISTWIDTH] IN BLOOD BY AUTOMATED COUNT: 15.1 % (ref 11.5–14.5)
EST. GFR  (NO RACE VARIABLE): 18.3 ML/MIN/1.73 M^2
GLUCOSE SERPL-MCNC: 101 MG/DL (ref 70–110)
HCT VFR BLD AUTO: 28 % (ref 40–54)
HGB BLD-MCNC: 8.7 G/DL (ref 14–18)
IMM GRANULOCYTES # BLD AUTO: 0.03 K/UL (ref 0–0.04)
IMM GRANULOCYTES NFR BLD AUTO: 0.7 % (ref 0–0.5)
LYMPHOCYTES # BLD AUTO: 1.1 K/UL (ref 1–4.8)
LYMPHOCYTES NFR BLD: 23.5 % (ref 18–48)
MAGNESIUM SERPL-MCNC: 2.1 MG/DL (ref 1.6–2.6)
MCH RBC QN AUTO: 23.5 PG (ref 27–31)
MCHC RBC AUTO-ENTMCNC: 31.1 G/DL (ref 32–36)
MCV RBC AUTO: 76 FL (ref 82–98)
MONOCYTES # BLD AUTO: 0.6 K/UL (ref 0.3–1)
MONOCYTES NFR BLD: 12.5 % (ref 4–15)
NEUTROPHILS # BLD AUTO: 2.7 K/UL (ref 1.8–7.7)
NEUTROPHILS NFR BLD: 58.9 % (ref 38–73)
NRBC BLD-RTO: 0 /100 WBC
PHOSPHATE SERPL-MCNC: 5.4 MG/DL (ref 2.7–4.5)
PHOSPHATE SERPL-MCNC: 5.4 MG/DL (ref 2.7–4.5)
PLATELET # BLD AUTO: 150 K/UL (ref 150–450)
PMV BLD AUTO: 12.2 FL (ref 9.2–12.9)
POCT GLUCOSE: 127 MG/DL (ref 70–110)
POCT GLUCOSE: 150 MG/DL (ref 70–110)
POCT GLUCOSE: 98 MG/DL (ref 70–110)
POTASSIUM SERPL-SCNC: 4.9 MMOL/L (ref 3.5–5.1)
RBC # BLD AUTO: 3.7 M/UL (ref 4.6–6.2)
SODIUM SERPL-SCNC: 138 MMOL/L (ref 136–145)
WBC # BLD AUTO: 4.55 K/UL (ref 3.9–12.7)

## 2024-03-22 PROCEDURE — 80069 RENAL FUNCTION PANEL: CPT | Performed by: HOSPITALIST

## 2024-03-22 PROCEDURE — 25000003 PHARM REV CODE 250: Performed by: HOSPITALIST

## 2024-03-22 PROCEDURE — 85025 COMPLETE CBC W/AUTO DIFF WBC: CPT | Performed by: HOSPITALIST

## 2024-03-22 PROCEDURE — 36415 COLL VENOUS BLD VENIPUNCTURE: CPT | Performed by: HOSPITALIST

## 2024-03-22 PROCEDURE — 83735 ASSAY OF MAGNESIUM: CPT | Performed by: HOSPITALIST

## 2024-03-22 PROCEDURE — 63600175 PHARM REV CODE 636 W HCPCS: Performed by: HOSPITALIST

## 2024-03-22 RX ORDER — GLIPIZIDE 10 MG/1
10 TABLET ORAL
Qty: 90 TABLET | Refills: 3 | Status: SHIPPED | OUTPATIENT
Start: 2024-03-22 | End: 2025-03-23

## 2024-03-22 RX ORDER — POLYETHYLENE GLYCOL 3350 17 G/17G
17 POWDER, FOR SOLUTION ORAL DAILY
Qty: 3 EACH | Refills: 0 | Status: SHIPPED | OUTPATIENT
Start: 2024-03-22 | End: 2024-03-25

## 2024-03-22 RX ORDER — CARVEDILOL 12.5 MG/1
12.5 TABLET ORAL 2 TIMES DAILY
Status: DISCONTINUED | OUTPATIENT
Start: 2024-03-22 | End: 2024-03-22 | Stop reason: HOSPADM

## 2024-03-22 RX ADMIN — ASPIRIN 81 MG: 81 TABLET, COATED ORAL at 08:03

## 2024-03-22 RX ADMIN — FUROSEMIDE 80 MG: 10 INJECTION, SOLUTION INTRAVENOUS at 09:03

## 2024-03-22 RX ADMIN — INSULIN DETEMIR 8 UNITS: 100 INJECTION, SOLUTION SUBCUTANEOUS at 08:03

## 2024-03-22 RX ADMIN — ACETAMINOPHEN 650 MG: 325 TABLET ORAL at 04:03

## 2024-03-22 RX ADMIN — NIFEDIPINE 90 MG: 30 TABLET, FILM COATED, EXTENDED RELEASE ORAL at 08:03

## 2024-03-22 RX ADMIN — FUROSEMIDE 80 MG: 10 INJECTION, SOLUTION INTRAVENOUS at 06:03

## 2024-03-22 RX ADMIN — DOCUSATE SODIUM AND SENNOSIDES 1 TABLET: 8.6; 5 TABLET, FILM COATED ORAL at 08:03

## 2024-03-22 RX ADMIN — HEPARIN SODIUM 5000 UNITS: 5000 INJECTION INTRAVENOUS; SUBCUTANEOUS at 06:03

## 2024-03-22 RX ADMIN — METOLAZONE 5 MG: 5 TABLET ORAL at 09:03

## 2024-03-22 RX ADMIN — POLYETHYLENE GLYCOL 3350 17 G: 17 POWDER, FOR SOLUTION ORAL at 08:03

## 2024-03-22 RX ADMIN — CARVEDILOL 12.5 MG: 12.5 TABLET, FILM COATED ORAL at 09:03

## 2024-03-22 RX ADMIN — ATORVASTATIN CALCIUM 80 MG: 40 TABLET, FILM COATED ORAL at 08:03

## 2024-03-22 NOTE — PLAN OF CARE
Problem: Adult Inpatient Plan of Care  Goal: Plan of Care Review  Outcome: Ongoing, Progressing     Problem: Diabetes Comorbidity  Goal: Blood Glucose Level Within Targeted Range  Outcome: Ongoing, Progressing     Problem: Adjustment to Illness (Heart Failure)  Goal: Optimal Coping  Outcome: Ongoing, Progressing     Problem: Cardiac Output Decreased (Heart Failure)  Goal: Optimal Cardiac Output  Outcome: Ongoing, Progressing     Problem: Fluid Imbalance (Heart Failure)  Goal: Fluid Balance  Outcome: Ongoing, Progressing     Problem: Functional Ability Impaired (Heart Failure)  Goal: Optimal Functional Ability  Outcome: Ongoing, Progressing      Pt AOx4. No acute events noted during shift. Vitals remained stable. No c/o pain or discomfort noted, scheduled meds given per MD orders. Q1-2hr safety checks completed. Bed remained low, locked, with all personal items in reach.

## 2024-03-22 NOTE — NURSING
AAOX4, VSS. Discharge instructions and paperwork give, pt verbalizes understanding and voice no concerns. Pt is waiting on bedside pharmacy for home medication.

## 2024-03-22 NOTE — DISCHARGE SUMMARY
DISCHARGE SUMMARY  Hospital Medicine    Team: [facility]    Patient Name: Chon Simon  YOB: 1955    Admit Date: 3/20/2024    Discharge Date: 03/22/2024    Discharge Attending Physician: Theresa John MD     Admitting Resident    Diagnoses:  Active Hospital Problems    Diagnosis  POA    *Acute pulmonary edema [J81.0]  Yes    Acute on chronic congestive heart failure [I50.9]  Yes    MAHAMED (acute kidney injury) [N17.9]  Yes    Hyperkalemia [E87.5]  Yes    Anemia due to chronic kidney disease [N18.9, D63.1]  Yes    Coronary artery disease involving native coronary artery without angina pectoris [I25.10]  Yes     Chronic      Cardiology stress test done and normal with previous signs of CAD in 06/2023   _ Recommend beta blocker       TTE on 5/9/2023 showed normal biventricular function with LVEF 55%. Wall motion assessment was difficult as Definity was not given. Lexiscan MPI on 5/18/2023 showed no evidence of ischemia. However, there was evidence of a medium to large size infarction of the inferolateral with some extension into the basal inferior and inferoseptal walls.      ENMANUEL (obstructive sleep apnea) [G47.33]  Yes     Chronic      2019 extremely severe ahi 73        Type 2 diabetes mellitus with stage 4 chronic kidney disease, with long-term current use of insulin [E11.22, N18.4, Z79.4]  Not Applicable      Medications: Lantus 25 U at bedtime and Glipizide 10 mg twice a day.    Hba1c 6.8   Complications:    - CKD stage 4 with microalbuminuria and stable, Saw Nephrology, Dr. Reinoso.    - History of DM retinopathy. Last eye exam: 12/2023   - No Neuropathy. Last foot exam: Podiatry 11/2023  with mild peripheral circulatory disorder   - History of CVA and CAD. Aspirin: Yes Statin: Yes      Benign essential hypertension [I10]  Yes     Chronic      Resolved Hospital Problems   No resolved problems to display.       Discharged Condition: admit problems have stabilized      Patient seen and  examined on date of discharge. 45 min spent on face to face time and  medical decision making.     Physical Exam  Vitals and nursing note reviewed.   Constitutional:       Appearance: He is obese. He is ill-appearing.   HENT:      Head: Normocephalic and atraumatic.      Mouth/Throat:      Mouth: Mucous membranes are moist.   Eyes:      General: No scleral icterus.     Pupils: Pupils are equal, round, and reactive to light.   Neck:      Comments: Unable to note the JVP  Cardiovascular:      Rate and Rhythm: Normal rate and regular rhythm.      Pulses: Normal pulses.   Pulmonary:      Effort: Pulmonary effort is normal. No respiratory distress.      Breath sounds: WNL  Abdominal:      General: Abdomen is protuberant.      Tenderness: There is no abdominal tenderness.   Skin:     General: Skin is warm and dry.   Neurological:      General: No focal deficit present.      Mental Status: He is alert and oriented to person, place, and time.   Psychiatric:         Mood and Affect: Mood normal.         Behavior: Behavior normal.      HOSPITAL COURSE:      Initial Presentation:    68-year-old AAM with type 2 diabetes, CKD stage 4, hypertension, ENMANUEL, CAD, hx Stroke w/ left hemiparesis presents to the emergency department with complaints of shortness of breath and dyspnea on exertion.       He is visiting [facility] from [facility], has been in the area for the last week, traveled here for a .  Reports over the last 2-3 weeks he has been experiencing shortness of breath but in the last week his symptoms are worse with dyspnea on exertion and reduced exercise tolerance.  He has noted since his arrival to [place] he has had upwards of 10 lb weight gain and notes that with ambulation he can not tolerate activity longer than 5-10 minutes.  He does have lower extremity or peripheral edema.  He denies any chest pain or chest pressure.  No nausea, diaphoresis or neck discomfort.  He has no known  diagnosis of heart failure has had prior stress testing.       He reports previously being on diuretics, furosemide as an outpatient but stopped this a few months ago for concern it was causing worsening renal function, this was in conjunction with his medical or nephrology team.  On ED workup today chest x-ray is concerning for bilateral airspace disease suspected edema.  He has no fevers chills, cough or productive cough symptoms, flu COVID RSV testing is negative.  He does feel that he has been ingesting more salt intake and potentially more fluid intake since his arrival.  He reports his he is already moved back his departure flight to Saturday 3/23, and he is anxious about being able to leave the hospital in order to make his flight.       He was given Lasix 40 mg IV in the emergency department with only 350 cc urine output, an additional Lasix 80 mg IV given after patient arrived to medical floor.  He has been placed on 1 L oxygen since arrival.     He lives in [place], with his wife, he is on disability/SSI.  Performs ADL at home uses a cane to aid ambulation.  No active tobacco alcohol or drug use.  He has a remote history of prior cocaine 25+ years ago.    Course of Principle Problem for Admission:    Acute pulmonary edema  At this time suspect patient with hypervolemic state the etiology may be related to worsening of his chronic kidney disease and subsequent hypervolemia, also potentially symptoms of new onset heart failure.    -on chart review of care everywhere he has had a prior echo in 2023 which had normal EF, normal LV systolic function    given lasix 40mg IV w/o adequate response - given 80mg IV lasix afterward  with good response.   - nephrology consulted and agree with lasix 80 mg po daily and close followup with nephrologist in [place].     stage 4 chronic kidney disease  Patient with acute kidney injury/acute renal failure likely due to  pre-renal secondary to hypervolemia  MAHAMED is currently  worsening. Baseline creatinine  2.7 in Jan 2024  -      Labs reviewed- Renal function/electrolytes with Estimated Creatinine Clearance: 21.3 mL/min (A) (based on SCr of 3.5 mg/dL (H)). according to latest data. Monitor urine output and serial BMP and adjust therapy as needed. Avoid nephrotoxins and renally dose meds for GFR listed above.  Hold losartan  Baseline creatinine  Discussed with patient who notes having a nephrologist at home.       CONSULTS: Nephrology     Last CBC/BMP/HgbA1c (if applicable):  Recent Results (from the past 336 hour(s))   CBC Auto Differential    Collection Time: 03/22/24  5:16 AM   Result Value Ref Range    WBC 4.55 3.90 - 12.70 K/uL    Hemoglobin 8.7 (L) 14.0 - 18.0 g/dL    Hematocrit 28.0 (L) 40.0 - 54.0 %    Platelets 150 150 - 450 K/uL   CBC Auto Differential    Collection Time: 03/21/24  5:17 AM   Result Value Ref Range    WBC 4.84 3.90 - 12.70 K/uL    Hemoglobin 8.4 (L) 14.0 - 18.0 g/dL    Hematocrit 26.8 (L) 40.0 - 54.0 %    Platelets 161 150 - 450 K/uL   CBC auto differential    Collection Time: 03/20/24  5:34 PM   Result Value Ref Range    WBC 6.46 3.90 - 12.70 K/uL    Hemoglobin 8.8 (L) 14.0 - 18.0 g/dL    Hematocrit 28.9 (L) 40.0 - 54.0 %    Platelets 158 150 - 450 K/uL     No results found for this or any previous visit (from the past 336 hour(s)).  Lab Results   Component Value Date    HGBA1C 6.3 (H) 03/21/2024       Disposition:  Home       Future Scheduled Appointments:  No future appointments.    Follow-up Plans from This Hospitalization:  Nephrology     Discharge Medication List:        Medication List        START taking these medications      polyethylene glycol 17 gram Pwpk  Commonly known as: GLYCOLAX  Take 17 g by mouth once daily. for 3 days            CHANGE how you take these medications      glipiZIDE 10 MG tablet  Commonly known as: GLUCOTROL  Take 1 tablet (10 mg total) by mouth daily with breakfast.  What changed: when to take this            CONTINUE taking  these medications      aspirin 81 MG EC tablet  Commonly known as: ECOTRIN     atorvastatin 80 MG tablet  Commonly known as: LIPITOR     carvediloL 12.5 MG tablet  Commonly known as: COREG     famotidine 20 MG tablet  Commonly known as: PEPCID     fluticasone propionate 50 mcg/actuation nasal spray  Commonly known as: FLONASE     insulin glargine 100 units/mL SubQ pen     NIFEdipine 90 MG (OSM) 24 hr tablet  Commonly known as: PROCARDIA-XL            STOP taking these medications      losartan 25 MG tablet  Commonly known as: COZAAR               Where to Get Your Medications        These medications were sent to [facility] [address] [address]      Hours: Mon-Fri 7a-7p, Sat-Sun 10a-4p Phone: [phone]   glipiZIDE 10 MG tablet  polyethylene glycol 17 gram Pwpk         Patient Instructions:  No discharge procedures on file.    Signing Physician:  Theresa John MD

## 2024-03-22 NOTE — ASSESSMENT & PLAN NOTE
67 y/o M w/ PMH DM II, CKD IV (baseline Cr 2.7), HTN, ENMANUEL, CAD, hx stroke w/ left hemiparesis who presented to [facility] with SOB and [place]TE[place]. Patient notes feeling SOB and becoming dyspneic with minimal exertion over the past several weeks, and also states that he has gained roughly 10 lbs in that span of time. Endorses eating increased quantity of salt recently, more fluid than normal. States that he was urinating more than normal prior to presentation. Patient has been on furosemide in the past but was recently discontinued (patient states that he was discontinued because it was causing his renal function to decline). CXR upon admission showed signs of pulm edema, and patient was administered 40 mg IV lasix in ED but only had 350 mL UOP, dose was increased to 80 mg IV BID w/ addition of Metolazone 5mg qd. UOP during first day of admission charted at 1480 mL. Labs on admission notable for Cr 3.5 (BL 2.7), BUN 58, K 5.3, Bicarb 17. UA remarkable for 2+ protein, 1+ occult blood. UPCR in January elevated to 2.19. BNP elevated at 429. Hg A1C 6.3. Echo following admission shows EF 50-55%, low normal systolic function, venous pressure 15. On Losartan at home, held on admission. No recent vomiting or diarrhea. No NSAID use. Nephrology consulted for MAHAMED on CKD.    Clinical picture of HF exacerbation despite low normal EF, normal diastolic function. Suggest continuing current diuresis regimen. Cr may increase temporarily as diuresis is achieved; will allow for some degree of permissive hypercreatinemia. Will possibly assess venous pressure with POC US in coming days as assessment of volume status.    3/22: Renal function stable (Cr 3.5 from 3.4), on room air, improved volume status s/p diuresis. Stable for discharge and outpatient follow up with his nephrologist in [place], [place]. Can discharge on Lasix 80 mg PO daily until follow up.

## 2024-03-22 NOTE — SUBJECTIVE & OBJECTIVE
Interval History: Cr stable 3.5 from 3.4. Less fluid overloaded on exam. On room air.    Review of patient's allergies indicates:   Allergen Reactions    Lisinopril Other (See Comments)     Other Reaction(s): Cough    cough   Other reaction(s): Cough   n/a    n/a     Current Facility-Administered Medications   Medication Frequency    acetaminophen tablet 650 mg Q4H PRN    aluminum-magnesium hydroxide-simethicone 200-200-20 mg/5 mL suspension 30 mL QID PRN    aspirin EC tablet 81 mg Daily    atorvastatin tablet 80 mg Daily    bisacodyL suppository 10 mg Daily PRN    carvediloL tablet 12.5 mg BID    dextrose 10% bolus 125 mL 125 mL PRN    dextrose 10% bolus 250 mL 250 mL PRN    famotidine tablet 20 mg Daily PRN    fluticasone propionate 50 mcg/actuation nasal spray 50 mcg Daily PRN    furosemide injection 80 mg BID    glucagon (human recombinant) injection 1 mg PRN    glucose chewable tablet 16 g PRN    glucose chewable tablet 24 g PRN    heparin (porcine) injection 5,000 Units Q8H    insulin aspart U-100 pen 0-5 Units QID (AC + HS) PRN    insulin detemir U-100 (Levemir) pen 8 Units BID    melatonin tablet 6 mg Nightly PRN    metOLazone tablet 5 mg Before breakfast    naloxone 0.4 mg/mL injection 0.02 mg PRN    NIFEdipine 24 hr tablet 90 mg Daily    ondansetron injection 4 mg Q8H PRN    polyethylene glycol packet 17 g BID PRN    polyethylene glycol packet 17 g Daily    prochlorperazine injection Soln 5 mg Q6H PRN    senna-docusate 8.6-50 mg per tablet 1 tablet Daily PRN    senna-docusate 8.6-50 mg per tablet 1 tablet BID    sodium chloride 0.9% flush 10 mL Q6H PRN       Objective:     Vital Signs (Most Recent):  Temp: 98 °F (36.7 °C) (03/22/24 0830)  Pulse: 73 (03/22/24 0830)  Resp: 18 (03/22/24 0830)  BP: (!) 157/77 (03/22/24 0830)  SpO2: 95 % (03/22/24 0830) Vital Signs (24h Range):  Temp:  [97.6 °F (36.4 °C)-99.3 °F (37.4 °C)] 98 °F (36.7 °C)  Pulse:  [69-75] 73  Resp:  [18-19] 18  SpO2:  [95 %-97 %] 95 %  BP:  (138-162)/(65-77) 157/77     Weight: 85.3 kg (188 lb) (03/21/24 1115)  Body mass index is 31.28 kg/m².  Body surface area is 1.98 meters squared.    I/O last 3 completed shifts:  In: 350 [P.O.:350]  Out: 3080 [Urine:3080]     Physical Exam  Constitutional:       General: He is not in acute distress.  HENT:      Nose: Nose normal.      Mouth/Throat:      Mouth: Mucous membranes are moist.   Eyes:      Pupils: Pupils are equal, round, and reactive to light.   Cardiovascular:      Rate and Rhythm: Normal rate and regular rhythm.   Pulmonary:      Effort: Pulmonary effort is normal. No respiratory distress.   Abdominal:      General: Abdomen is flat. There is no distension.      Palpations: Abdomen is soft.      Tenderness: There is no abdominal tenderness. There is no right CVA tenderness or left CVA tenderness.   Musculoskeletal:      Cervical back: Normal range of motion.   Skin:     General: Skin is warm and dry.   Neurological:      Mental Status: He is alert and oriented to person, place, and time.          Significant Labs:  All labs within the past 24 hours have been reviewed.     Significant Imaging:

## 2024-03-22 NOTE — PROGRESS NOTES
[facility] ([place] [address])  Nephrology  Progress Note    Patient Name: Chon Simon  MRN: [id number]  Admission Date: 3/20/2024  Hospital Length of Stay: 2 days  Attending Provider: Theresa John MD   Primary Care Physician: Louisa, Primary Doctor  Principal Problem:Acute pulmonary edema    Subjective:     HPI: 69 y/o M w/ PMH DM II, CKD IV (baseline Cr 2.7), HTN, ENMANUEL, CAD, hx stroke w/ left hemiparesis who presented to [facility] with SOB and ORTEGA. Patient notes feeling SOB and becoming dyspneic with minimal exertion over the past several weeks, and also states that he has gained roughly 10 lbs in that span of time. Endorses eating increased quantity of salt recently, more fluid than normal. States that he was urinating more than normal prior to presentation. Patient has been on furosemide in the past but was recently discontinued (patient states that he was discontinued because it was causing his renal function to decline). CXR upon admission showed signs of pulm edema, and patient was administered 40 mg IV lasix in ED but only had 350 mL UOP, dose was increased to 80 mg IV BID w/ addition of Metolazone 5mg qd. UOP during first day of admission charted at 1480 mL. Labs on admission notable for Cr 3.5 (BL 2.7), BUN 58, K 5.3, Bicarb 17. UA remarkable for 2+ protein, 1+ occult blood. UPCR in January elevated to 2.19. BNP elevated at 429. Hg A1C 6.3. Echo following admission shows EF 50-55%, low normal systolic function. On Losartan at home, held on admission. No recent vomiting or diarrhea. No NSAID use. Nephrology consulted for MAHAMED on CKD.    Interval History: Cr stable 3.5 from 3.4. Less fluid overloaded on exam. On room air.    Review of patient's allergies indicates:   Allergen Reactions    Lisinopril Other (See Comments)     Other Reaction(s): Cough    cough   Other reaction(s): Cough   n/a    n/a     Current Facility-Administered Medications   Medication Frequency    acetaminophen tablet 650 mg Q4H PRN     aluminum-magnesium hydroxide-simethicone 200-200-20 mg/5 mL suspension 30 mL QID PRN    aspirin EC tablet 81 mg Daily    atorvastatin tablet 80 mg Daily    bisacodyL suppository 10 mg Daily PRN    carvediloL tablet 12.5 mg BID    dextrose 10% bolus 125 mL 125 mL PRN    dextrose 10% bolus 250 mL 250 mL PRN    famotidine tablet 20 mg Daily PRN    fluticasone propionate 50 mcg/actuation nasal spray 50 mcg Daily PRN    furosemide injection 80 mg BID    glucagon (human recombinant) injection 1 mg PRN    glucose chewable tablet 16 g PRN    glucose chewable tablet 24 g PRN    heparin (porcine) injection 5,000 Units Q8H    insulin aspart U-100 pen 0-5 Units QID (AC + HS) PRN    insulin detemir U-100 (Levemir) pen 8 Units BID    melatonin tablet 6 mg Nightly PRN    metOLazone tablet 5 mg Before breakfast    naloxone 0.4 mg/mL injection 0.02 mg PRN    NIFEdipine 24 hr tablet 90 mg Daily    ondansetron injection 4 mg Q8H PRN    polyethylene glycol packet 17 g BID PRN    polyethylene glycol packet 17 g Daily    prochlorperazine injection Soln 5 mg Q6H PRN    senna-docusate 8.6-50 mg per tablet 1 tablet Daily PRN    senna-docusate 8.6-50 mg per tablet 1 tablet BID    sodium chloride 0.9% flush 10 mL Q6H PRN       Objective:     Vital Signs (Most Recent):  Temp: 98 °F (36.7 °C) (03/22/24 0830)  Pulse: 73 (03/22/24 0830)  Resp: 18 (03/22/24 0830)  BP: (!) 157/77 (03/22/24 0830)  SpO2: 95 % (03/22/24 0830) Vital Signs (24h Range):  Temp:  [97.6 °F (36.4 °C)-99.3 °F (37.4 °C)] 98 °F (36.7 °C)  Pulse:  [69-75] 73  Resp:  [18-19] 18  SpO2:  [95 %-97 %] 95 %  BP: (138-162)/(65-77) 157/77     Weight: 85.3 kg (188 lb) (03/21/24 1115)  Body mass index is 31.28 kg/m².  Body surface area is 1.98 meters squared.    I/O last 3 completed shifts:  In: 350 [P.O.:350]  Out: 3080 [Urine:3080]     Physical Exam  Constitutional:       General: He is not in acute distress.  HENT:      Nose: Nose normal.      Mouth/Throat:      Mouth: Mucous membranes  are moist.   Eyes:      Pupils: Pupils are equal, round, and reactive to light.   Cardiovascular:      Rate and Rhythm: Normal rate and regular rhythm.   Pulmonary:      Effort: Pulmonary effort is normal. No respiratory distress.   Abdominal:      General: Abdomen is flat. There is no distension.      Palpations: Abdomen is soft.      Tenderness: There is no abdominal tenderness. There is no right CVA tenderness or left CVA tenderness.   Musculoskeletal:      Cervical back: Normal range of motion.   Skin:     General: Skin is warm and dry.   Neurological:      Mental Status: He is alert and oriented to person, place, and time.          Significant Labs:  All labs within the past 24 hours have been reviewed.     Significant Imaging:     Assessment/Plan:     Renal/  MAHAMED (acute kidney injury)  69 y/o M w/ PMH DM II, CKD IV (baseline Cr 2.7), HTN, ENMANUEL, CAD, hx stroke w/ left hemiparesis who presented to [facility] with SOB and ORTEGA. Patient notes feeling SOB and becoming dyspneic with minimal exertion over the past several weeks, and also states that he has gained roughly 10 lbs in that span of time. Endorses eating increased quantity of salt recently, more fluid than normal. States that he was urinating more than normal prior to presentation. Patient has been on furosemide in the past but was recently discontinued (patient states that he was discontinued because it was causing his renal function to decline). CXR upon admission showed signs of pulm edema, and patient was administered 40 mg IV lasix in ED but only had 350 mL UOP, dose was increased to 80 mg IV BID w/ addition of Metolazone 5mg qd. UOP during first day of admission charted at 1480 mL. Labs on admission notable for Cr 3.5 (BL 2.7), BUN 58, K 5.3, Bicarb 17. UA remarkable for 2+ protein, 1+ occult blood. UPCR in January elevated to 2.19. BNP elevated at 429. Hg A1C 6.3. Echo following admission shows EF 50-55%, low normal systolic function, venous pressure 15.  On Losartan at home, held on admission. No recent vomiting or diarrhea. No NSAID use. Nephrology consulted for MAHAMED on CKD.    Clinical picture of HF exacerbation despite low normal EF, normal diastolic function. Suggest continuing current diuresis regimen. Cr may increase temporarily as diuresis is achieved; will allow for some degree of permissive hypercreatinemia. Will possibly assess venous pressure with POC US in coming days as assessment of volume status.    3/22: Renal function stable (Cr 3.5 from 3.4), on room air, improved volume status s/p diuresis. Stable for discharge and outpatient follow up with his nephrologist in [place], [place]. Can discharge on Lasix 80 mg PO daily until follow up.         Thank you for your consult. I will sign off. Please contact us if you have any additional questions.    Kiran Harper MD  Nephrology  [facility] ([facility]    ATTENDING PHYSICIAN ATTESTATION  I have personally verified the history and examined the patient. I thoroughly reviewed the demographic, clinical, laboratorial and imaging information available in medical records. I agree with the assessment and recommendations provided by the subspecialty resident who was under my supervision.

## 2024-03-22 NOTE — PLAN OF CARE
CM spoke with pt in room.  He states a friend will pick him up from hospital.  Denies DME needs.  Instructed pt in d/c process; pt v/u.    EVELINE JohnstonN, BS, RN, CCM

## 2024-03-25 NOTE — PLAN OF CARE
[facility] ([facility])  Discharge Final Note    Primary Care Provider: No, Primary Doctor    Expected Discharge Date: 3/22/2024    Final Discharge Note (most recent)       Final Note - 03/25/24 0819          Final Note    Assessment Type Final Discharge Note (P)      Anticipated Discharge Disposition Home or Self Care (P)         Post-Acute Status    Discharge Delays None known at this time (P)                      Important Message from Medicare  Pt d/c'd to home.    Ruby Muniz, EVELINEN, BS, RN, CCM

## 2024-03-26 NOTE — PHYSICIAN QUERY
PT Name: Chon Simon  MR #: [id number]     DOCUMENTATION CLARIFICATION     CDS/: Ericka Lomeli RN CDS             Contact information:[email or web address]  This form is a permanent document in the medical record.     Query Date: March 26, 2024    By submitting this query, we are merely seeking further clarification of documentation.  Please utilize your independent clinical judgment when addressing the question(s) below.    The Medical Record contains the following   Indicators Supporting Clinical Findings Location in Medical Record     X Heart Failure documented At this time suspect patient with hypervolemic state the etiology may be related to worsening of his chronic kidney disease and subsequent hypervolemia, also potentially symptoms of new onset heart failure     Acute pulmonary edema     Clinical picture of HF exacerbation despite low normal EF, normal diastolic function. Suggest continuing current diuresis regimen    H&P, Dr Malhotra, 3/20            Nephrology, Dr Herrera, 3/21     X BNP  429   Labs, 3/20     X EF/Echo On chart review of care everywhere he has had a prior echo in 2023 which had normal EF, normal LV systolic function     Summary  Left Ventricle: There is low normal systolic function with a visually estimated ejection fraction of 50 - 55%.  Right Ventricle: Normal right ventricular cavity size. Systolic function is normal.  Left Atrium: Left atrium is mildly dilated.  Aortic Valve: There is mild annular calcification present. Aortic valve peak  velocity is 1.49 m/s. Mean gradient is 5 mmHg.  Mitral Valve: There is mild anterior leaflet sclerosis.  Tricuspid Valve: There is mild regurgitation.  Pulmonary Artery: The estimated pulmonary artery systolic pressure is  39 mmHg.  IVC/SVC: Elevated venous pressure at 15 mmHg.     Findings  The left ventricle is normal in size. Normal wall thickness. Normal wall motion. There is low normal systolic function with a visually estimated ejection  fraction of 50 - 55%.   There is indeterminate diastolic function.        H&P, Dr Malhotra, 3/20      Echo, 3/21     X Radiology findings CXR: Pulmonary edema or multifocal pneumonia.    Imaging, 3/20     X Subjective/Objective Respiratory Conditions Presents to the emergency department with complaints of shortness of breath and dyspnea on exertion. Placed on 1L NC     Rales present.    H&P, Dr Malhotra, 3/20    Recent/Current MI      Heart Transplant, LVAD       X Edema, JVD Right lower leg: 3+ Edema (Dependent into the thighs)     Left lower leg: 3+ Edema (Dependent into the thighs) present    H&P, Dr Malhotra, 3/20    Ascites       X Diuretics/Meds He was given Lasix 40 mg IV in the emergency department with only 350 cc urine output, an additional Lasix 80 mg IV given after patient arrived to medical floor.     Lasix 80 mg IV twice daily   H&P, Dr Malhotra, 3/20        MAR, 3/20-3/22     X Other Treatment 1L NC   H&P, Dr Malhotra, 3/20     X Other He is visiting [facility] from [facility], has been in the area for the last week   He has noted since his arrival to [place] he has had upwards of 10 lb weight gain     Pt's abdomen appears rounded and distended. Per pt, last BM was yesterday 3/20 during the day    H&P, Dr Malhotra, 3/20          Nursing, HALEY Multani RN, 3/21     Heart failure is a clinical diagnosis which includes symptomatic fluid retention, elevated intracardiac pressures, and/or the inability of the heart to deliver adequate blood flow.    Heart Failure with reduced Ejection Fraction (HFrEF) or Systolic Heart Failure (loses ability to contract normally, EF is <40%)    Heart Failure with preserved Ejection Fraction (HFpEF) or Diastolic Heart Failure (stiff ventricles, does not relax properly, EF is >50%)     Heart Failure with Combined Systolic and Diastolic Failure (stiff ventricles, does not relax properly and EF is <50%)    Mid-range or mildly reduced  ejection fraction (HFmrEF) is classified as systolic heart failure.  Congestive heart failure with a recovered EF is classified as Diastolic Heart Failure.  Common clues to acute exacerbation:  Rapidly progressive symptoms (w/in 2 weeks of presentation), using IV diuretics, using supplemental O2, pulmonary edema on Xray, new or worsening pleural effusion, +JVD or other signs of volume overload, MI w/in 4 weeks, and/or BNP >500  The clinical guidelines noted are only system guidelines, and do not replace the providers clinical judgment.    Provider, please specify the diagnosis associated with the above clinical findings.    [   ]  Acute Diastolic Heart Failure (HFpEF) - new diagnosis   [   x]  Acute on Chronic Diastolic Heart Failure (HFpEF) - worsening of CHF signs/symptoms in preexisting CHF   [   ]  Chronic Diastolic Heart Failure (HFpEF) - preexisting and stable   [   ]  Heart Failure Ruled Out   [   ]  Other (please specify): ___________________________________           Please document in your progress notes daily for the duration of treatment until resolved and include in your discharge summary.    References:  American Heart Association editorial staff. (2017, May). Ejection Fraction Heart Failure Measurement. American Heart Association. https://www.heart.org/en/health-topics/heart-failure/diagnosing-heart-failure/ejection-fraction-heart-failure-measurement#:~:text=Ejection%20fraction%20(EF)%20is%20a,pushed%20out%20with%20each%20heartbeat  INDIRA Novak (2020, December 15). Heart failure with preserved ejection fraction: Clinical manifestations and diagnosis. [employer]. https://www.[employer]/contents/heart-failure-with-preserved-ejection-fraction-clinical-manifestations-and-diagnosis.  ICD-10-CM/PCS Coding Clinic Third Quarter ICD-10, Effective with discharges: September 8, 2020 [facility] § Heart failure with mid-range or mildly reduced ejection fraction (2020).  ICD-10-CM/PCS Coding  Clinic Third Quarter ICD-10, Effective with discharges: September 8, 2020 [facility] § Heart failure with recovered ejection fraction (2020).  Form No. [id number]